# Patient Record
Sex: MALE | Race: ASIAN | NOT HISPANIC OR LATINO
[De-identification: names, ages, dates, MRNs, and addresses within clinical notes are randomized per-mention and may not be internally consistent; named-entity substitution may affect disease eponyms.]

---

## 2017-04-24 PROBLEM — Z00.00 ENCOUNTER FOR PREVENTIVE HEALTH EXAMINATION: Status: ACTIVE | Noted: 2017-04-24

## 2017-04-25 ENCOUNTER — APPOINTMENT (OUTPATIENT)
Dept: SURGERY | Facility: CLINIC | Age: 57
End: 2017-04-25

## 2017-04-25 VITALS
WEIGHT: 148 LBS | BODY MASS INDEX: 23.23 KG/M2 | DIASTOLIC BLOOD PRESSURE: 97 MMHG | HEIGHT: 67 IN | SYSTOLIC BLOOD PRESSURE: 144 MMHG

## 2017-04-25 DIAGNOSIS — L40.50 ARTHROPATHIC PSORIASIS, UNSPECIFIED: ICD-10-CM

## 2017-04-25 DIAGNOSIS — Z86.79 PERSONAL HISTORY OF OTHER DISEASES OF THE CIRCULATORY SYSTEM: ICD-10-CM

## 2017-04-25 DIAGNOSIS — Z87.891 PERSONAL HISTORY OF NICOTINE DEPENDENCE: ICD-10-CM

## 2017-04-25 DIAGNOSIS — Z78.9 OTHER SPECIFIED HEALTH STATUS: ICD-10-CM

## 2017-04-26 ENCOUNTER — TRANSCRIPTION ENCOUNTER (OUTPATIENT)
Age: 57
End: 2017-04-26

## 2017-04-28 DIAGNOSIS — K31.9 DISEASE OF STOMACH AND DUODENUM, UNSPECIFIED: ICD-10-CM

## 2017-05-11 ENCOUNTER — OUTPATIENT (OUTPATIENT)
Dept: OUTPATIENT SERVICES | Facility: HOSPITAL | Age: 57
LOS: 1 days | End: 2017-05-11
Payer: MEDICAID

## 2017-05-11 ENCOUNTER — RESULT REVIEW (OUTPATIENT)
Age: 57
End: 2017-05-11

## 2017-05-11 ENCOUNTER — APPOINTMENT (OUTPATIENT)
Dept: GASTROENTEROLOGY | Facility: HOSPITAL | Age: 57
End: 2017-05-11

## 2017-05-11 DIAGNOSIS — K31.9 DISEASE OF STOMACH AND DUODENUM, UNSPECIFIED: ICD-10-CM

## 2017-05-11 PROCEDURE — C1726: CPT

## 2017-05-11 PROCEDURE — 43237 ENDOSCOPIC US EXAM ESOPH: CPT | Mod: XS

## 2017-05-11 PROCEDURE — 43239 EGD BIOPSY SINGLE/MULTIPLE: CPT

## 2017-05-22 ENCOUNTER — APPOINTMENT (OUTPATIENT)
Dept: CARDIOLOGY | Facility: CLINIC | Age: 57
End: 2017-05-22

## 2017-05-22 VITALS
RESPIRATION RATE: 15 BRPM | HEIGHT: 67 IN | SYSTOLIC BLOOD PRESSURE: 135 MMHG | HEART RATE: 69 BPM | WEIGHT: 146 LBS | BODY MASS INDEX: 22.91 KG/M2 | DIASTOLIC BLOOD PRESSURE: 87 MMHG

## 2017-05-22 DIAGNOSIS — E78.5 HYPERLIPIDEMIA, UNSPECIFIED: ICD-10-CM

## 2017-05-22 DIAGNOSIS — Z01.818 ENCOUNTER FOR OTHER PREPROCEDURAL EXAMINATION: ICD-10-CM

## 2017-05-22 DIAGNOSIS — I10 ESSENTIAL (PRIMARY) HYPERTENSION: ICD-10-CM

## 2017-05-24 ENCOUNTER — OUTPATIENT (OUTPATIENT)
Dept: OUTPATIENT SERVICES | Facility: HOSPITAL | Age: 57
LOS: 1 days | End: 2017-05-24
Payer: MEDICAID

## 2017-05-24 VITALS
RESPIRATION RATE: 16 BRPM | OXYGEN SATURATION: 99 % | TEMPERATURE: 98 F | HEIGHT: 67 IN | DIASTOLIC BLOOD PRESSURE: 74 MMHG | HEART RATE: 57 BPM | SYSTOLIC BLOOD PRESSURE: 117 MMHG | WEIGHT: 149.91 LBS

## 2017-05-24 DIAGNOSIS — K25.7 CHRONIC GASTRIC ULCER WITHOUT HEMORRHAGE OR PERFORATION: ICD-10-CM

## 2017-05-24 DIAGNOSIS — Z90.49 ACQUIRED ABSENCE OF OTHER SPECIFIED PARTS OF DIGESTIVE TRACT: Chronic | ICD-10-CM

## 2017-05-24 DIAGNOSIS — K31.9 DISEASE OF STOMACH AND DUODENUM, UNSPECIFIED: ICD-10-CM

## 2017-05-24 DIAGNOSIS — I10 ESSENTIAL (PRIMARY) HYPERTENSION: ICD-10-CM

## 2017-05-24 LAB
BLD GP AB SCN SERPL QL: NEGATIVE — SIGNIFICANT CHANGE UP
RH IG SCN BLD-IMP: POSITIVE — SIGNIFICANT CHANGE UP

## 2017-05-24 RX ORDER — CEFAZOLIN SODIUM 1 G
2000 VIAL (EA) INJECTION ONCE
Qty: 0 | Refills: 0 | Status: DISCONTINUED | OUTPATIENT
Start: 2017-06-05 | End: 2017-06-05

## 2017-05-24 RX ORDER — SODIUM CHLORIDE 9 MG/ML
3 INJECTION INTRAMUSCULAR; INTRAVENOUS; SUBCUTANEOUS EVERY 8 HOURS
Qty: 0 | Refills: 0 | Status: DISCONTINUED | OUTPATIENT
Start: 2017-06-05 | End: 2017-06-05

## 2017-05-24 NOTE — H&P PST ADULT - GIT GEN HX ROS MEA POS PC
abdominal discomfort after eating x many years/nausea abdominal discomfort/epigastric pain after eating x many years/nausea

## 2017-05-24 NOTE — H&P PST ADULT - HISTORY OF PRESENT ILLNESS
56 year old Greek male refused to use free  services requested to use his nursing student son for translation with PMH of HTN, HLD, chronic gastric ulcer found to have  high grade dysplasia on the gastric antrum planned for laparoscopic gastrectomy.

## 2017-05-24 NOTE — H&P PST ADULT - PMH
Chronic gastric ulcer, unspecified whether gastric ulcer hemorrhage or perforation present    Hyperlipidemia, unspecified hyperlipidemia type Chronic gastric ulcer, unspecified whether gastric ulcer hemorrhage or perforation present    Essential hypertension    Gastric lesion  with high grade dysplasia  Hyperlipidemia, unspecified hyperlipidemia type

## 2017-05-24 NOTE — H&P PST ADULT - NSANTHOSAYNRD_GEN_A_CORE
No. RANDA screening performed.  STOP BANG Legend: 0-2 = LOW Risk; 3-4 = INTERMEDIATE Risk; 5-8 = HIGH Risk

## 2017-05-24 NOTE — H&P PST ADULT - PROBLEM SELECTOR PLAN 1
laparoscopic gastrectomy  PST labs send  preprocedure surgical scrub instructions discussed with syd

## 2017-05-25 LAB
ANION GAP SERPL CALC-SCNC: 18 MMOL/L — HIGH (ref 5–17)
BUN SERPL-MCNC: 15 MG/DL — SIGNIFICANT CHANGE UP (ref 7–23)
CALCIUM SERPL-MCNC: 9.1 MG/DL — SIGNIFICANT CHANGE UP (ref 8.4–10.5)
CHLORIDE SERPL-SCNC: 100 MMOL/L — SIGNIFICANT CHANGE UP (ref 96–108)
CO2 SERPL-SCNC: 23 MMOL/L — SIGNIFICANT CHANGE UP (ref 22–31)
CREAT SERPL-MCNC: 0.67 MG/DL — SIGNIFICANT CHANGE UP (ref 0.5–1.3)
GLUCOSE SERPL-MCNC: 107 MG/DL — HIGH (ref 70–99)
HCT VFR BLD CALC: 46.4 % — SIGNIFICANT CHANGE UP (ref 39–50)
HGB BLD-MCNC: 14.9 G/DL — SIGNIFICANT CHANGE UP (ref 13–17)
MCHC RBC-ENTMCNC: 30.2 PG — SIGNIFICANT CHANGE UP (ref 27–34)
MCHC RBC-ENTMCNC: 32.1 GM/DL — SIGNIFICANT CHANGE UP (ref 32–36)
MCV RBC AUTO: 94.1 FL — SIGNIFICANT CHANGE UP (ref 80–100)
PLATELET # BLD AUTO: 326 K/UL — SIGNIFICANT CHANGE UP (ref 150–400)
POTASSIUM SERPL-MCNC: 4.5 MMOL/L — SIGNIFICANT CHANGE UP (ref 3.5–5.3)
POTASSIUM SERPL-SCNC: 4.5 MMOL/L — SIGNIFICANT CHANGE UP (ref 3.5–5.3)
RBC # BLD: 4.93 M/UL — SIGNIFICANT CHANGE UP (ref 4.2–5.8)
RBC # FLD: 12.8 % — SIGNIFICANT CHANGE UP (ref 10.3–14.5)
SODIUM SERPL-SCNC: 141 MMOL/L — SIGNIFICANT CHANGE UP (ref 135–145)
WBC # BLD: 7.02 K/UL — SIGNIFICANT CHANGE UP (ref 3.8–10.5)
WBC # FLD AUTO: 7.02 K/UL — SIGNIFICANT CHANGE UP (ref 3.8–10.5)

## 2017-05-30 ENCOUNTER — APPOINTMENT (OUTPATIENT)
Dept: SURGERY | Facility: CLINIC | Age: 57
End: 2017-05-30

## 2017-05-30 VITALS
OXYGEN SATURATION: 97 % | TEMPERATURE: 98 F | DIASTOLIC BLOOD PRESSURE: 85 MMHG | RESPIRATION RATE: 18 BRPM | WEIGHT: 152 LBS | HEART RATE: 58 BPM | SYSTOLIC BLOOD PRESSURE: 136 MMHG | BODY MASS INDEX: 23.81 KG/M2

## 2017-06-05 ENCOUNTER — RESULT REVIEW (OUTPATIENT)
Age: 57
End: 2017-06-05

## 2017-06-05 ENCOUNTER — TRANSCRIPTION ENCOUNTER (OUTPATIENT)
Age: 57
End: 2017-06-05

## 2017-06-05 ENCOUNTER — INPATIENT (INPATIENT)
Facility: HOSPITAL | Age: 57
LOS: 2 days | Discharge: HOME | DRG: 328 | End: 2017-06-08
Attending: SURGERY | Admitting: SURGERY
Payer: MEDICAID

## 2017-06-05 VITALS
TEMPERATURE: 98 F | SYSTOLIC BLOOD PRESSURE: 138 MMHG | RESPIRATION RATE: 16 BRPM | HEART RATE: 58 BPM | DIASTOLIC BLOOD PRESSURE: 821 MMHG | OXYGEN SATURATION: 100 %

## 2017-06-05 DIAGNOSIS — K25.7 CHRONIC GASTRIC ULCER WITHOUT HEMORRHAGE OR PERFORATION: ICD-10-CM

## 2017-06-05 DIAGNOSIS — Z90.49 ACQUIRED ABSENCE OF OTHER SPECIFIED PARTS OF DIGESTIVE TRACT: Chronic | ICD-10-CM

## 2017-06-05 LAB — RH IG SCN BLD-IMP: POSITIVE — SIGNIFICANT CHANGE UP

## 2017-06-05 PROCEDURE — 88309 TISSUE EXAM BY PATHOLOGIST: CPT | Mod: 26

## 2017-06-05 PROCEDURE — 88331 PATH CONSLTJ SURG 1 BLK 1SPC: CPT | Mod: 26

## 2017-06-05 PROCEDURE — 88360 TUMOR IMMUNOHISTOCHEM/MANUAL: CPT | Mod: 26

## 2017-06-05 PROCEDURE — 43659 UNLISTED LAPS PX STOMACH: CPT | Mod: GC

## 2017-06-05 RX ORDER — MORPHINE SULFATE 50 MG/1
2 CAPSULE, EXTENDED RELEASE ORAL EVERY 4 HOURS
Qty: 0 | Refills: 0 | Status: DISCONTINUED | OUTPATIENT
Start: 2017-06-05 | End: 2017-06-08

## 2017-06-05 RX ORDER — ACETAMINOPHEN 500 MG
1000 TABLET ORAL ONCE
Qty: 0 | Refills: 0 | Status: COMPLETED | OUTPATIENT
Start: 2017-06-06 | End: 2017-06-06

## 2017-06-05 RX ORDER — ACETAMINOPHEN 500 MG
1000 TABLET ORAL ONCE
Qty: 0 | Refills: 0 | Status: COMPLETED | OUTPATIENT
Start: 2017-06-05 | End: 2017-06-05

## 2017-06-05 RX ORDER — MORPHINE SULFATE 50 MG/1
4 CAPSULE, EXTENDED RELEASE ORAL EVERY 4 HOURS
Qty: 0 | Refills: 0 | Status: DISCONTINUED | OUTPATIENT
Start: 2017-06-05 | End: 2017-06-08

## 2017-06-05 RX ORDER — ENOXAPARIN SODIUM 100 MG/ML
40 INJECTION SUBCUTANEOUS DAILY
Qty: 0 | Refills: 0 | Status: DISCONTINUED | OUTPATIENT
Start: 2017-06-05 | End: 2017-06-08

## 2017-06-05 RX ORDER — ACETAMINOPHEN 500 MG
1000 TABLET ORAL ONCE
Qty: 0 | Refills: 0 | Status: DISCONTINUED | OUTPATIENT
Start: 2017-06-05 | End: 2017-06-07

## 2017-06-05 RX ORDER — ONDANSETRON 8 MG/1
4 TABLET, FILM COATED ORAL ONCE
Qty: 0 | Refills: 0 | Status: DISCONTINUED | OUTPATIENT
Start: 2017-06-05 | End: 2017-06-05

## 2017-06-05 RX ORDER — METOPROLOL TARTRATE 50 MG
5 TABLET ORAL EVERY 6 HOURS
Qty: 0 | Refills: 0 | Status: DISCONTINUED | OUTPATIENT
Start: 2017-06-05 | End: 2017-06-06

## 2017-06-05 RX ORDER — PANTOPRAZOLE SODIUM 20 MG/1
40 TABLET, DELAYED RELEASE ORAL DAILY
Qty: 0 | Refills: 0 | Status: DISCONTINUED | OUTPATIENT
Start: 2017-06-05 | End: 2017-06-06

## 2017-06-05 RX ORDER — HYDROMORPHONE HYDROCHLORIDE 2 MG/ML
0.5 INJECTION INTRAMUSCULAR; INTRAVENOUS; SUBCUTANEOUS
Qty: 0 | Refills: 0 | Status: DISCONTINUED | OUTPATIENT
Start: 2017-06-05 | End: 2017-06-05

## 2017-06-05 RX ORDER — SODIUM CHLORIDE 9 MG/ML
1000 INJECTION, SOLUTION INTRAVENOUS
Qty: 0 | Refills: 0 | Status: DISCONTINUED | OUTPATIENT
Start: 2017-06-05 | End: 2017-06-06

## 2017-06-05 RX ADMIN — Medication 1000 MILLIGRAM(S): at 20:02

## 2017-06-05 RX ADMIN — Medication 5 MILLIGRAM(S): at 17:18

## 2017-06-05 RX ADMIN — Medication 400 MILLIGRAM(S): at 19:42

## 2017-06-05 RX ADMIN — MORPHINE SULFATE 2 MILLIGRAM(S): 50 CAPSULE, EXTENDED RELEASE ORAL at 17:18

## 2017-06-05 RX ADMIN — MORPHINE SULFATE 2 MILLIGRAM(S): 50 CAPSULE, EXTENDED RELEASE ORAL at 17:48

## 2017-06-05 RX ADMIN — SODIUM CHLORIDE 100 MILLILITER(S): 9 INJECTION, SOLUTION INTRAVENOUS at 16:38

## 2017-06-06 LAB
ANION GAP SERPL CALC-SCNC: 11 MMOL/L — SIGNIFICANT CHANGE UP (ref 5–17)
BUN SERPL-MCNC: 9 MG/DL — SIGNIFICANT CHANGE UP (ref 7–23)
CALCIUM SERPL-MCNC: 9.2 MG/DL — SIGNIFICANT CHANGE UP (ref 8.4–10.5)
CHLORIDE SERPL-SCNC: 104 MMOL/L — SIGNIFICANT CHANGE UP (ref 96–108)
CO2 SERPL-SCNC: 31 MMOL/L — SIGNIFICANT CHANGE UP (ref 22–31)
CREAT SERPL-MCNC: 0.73 MG/DL — SIGNIFICANT CHANGE UP (ref 0.5–1.3)
GLUCOSE SERPL-MCNC: 98 MG/DL — SIGNIFICANT CHANGE UP (ref 70–99)
HCT VFR BLD CALC: 48.2 % — SIGNIFICANT CHANGE UP (ref 39–50)
HGB BLD-MCNC: 15.9 G/DL — SIGNIFICANT CHANGE UP (ref 13–17)
MAGNESIUM SERPL-MCNC: 3.2 MG/DL — HIGH (ref 1.6–2.6)
MCHC RBC-ENTMCNC: 31.3 PG — SIGNIFICANT CHANGE UP (ref 27–34)
MCHC RBC-ENTMCNC: 32.9 GM/DL — SIGNIFICANT CHANGE UP (ref 32–36)
MCV RBC AUTO: 95.1 FL — SIGNIFICANT CHANGE UP (ref 80–100)
PHOSPHATE SERPL-MCNC: 3.2 MG/DL — SIGNIFICANT CHANGE UP (ref 2.5–4.5)
PLATELET # BLD AUTO: 292 K/UL — SIGNIFICANT CHANGE UP (ref 150–400)
POTASSIUM SERPL-MCNC: 4.6 MMOL/L — SIGNIFICANT CHANGE UP (ref 3.5–5.3)
POTASSIUM SERPL-SCNC: 4.6 MMOL/L — SIGNIFICANT CHANGE UP (ref 3.5–5.3)
RBC # BLD: 5.06 M/UL — SIGNIFICANT CHANGE UP (ref 4.2–5.8)
RBC # FLD: 11.2 % — SIGNIFICANT CHANGE UP (ref 10.3–14.5)
SODIUM SERPL-SCNC: 146 MMOL/L — HIGH (ref 135–145)
WBC # BLD: 12.1 K/UL — HIGH (ref 3.8–10.5)
WBC # FLD AUTO: 12.1 K/UL — HIGH (ref 3.8–10.5)

## 2017-06-06 RX ORDER — METOPROLOL TARTRATE 50 MG
5 TABLET ORAL EVERY 6 HOURS
Qty: 0 | Refills: 0 | Status: DISCONTINUED | OUTPATIENT
Start: 2017-06-06 | End: 2017-06-08

## 2017-06-06 RX ORDER — PANTOPRAZOLE SODIUM 20 MG/1
40 TABLET, DELAYED RELEASE ORAL DAILY
Qty: 0 | Refills: 0 | Status: DISCONTINUED | OUTPATIENT
Start: 2017-06-06 | End: 2017-06-08

## 2017-06-06 RX ORDER — METOPROLOL TARTRATE 50 MG
5 TABLET ORAL EVERY 6 HOURS
Qty: 0 | Refills: 0 | Status: DISCONTINUED | OUTPATIENT
Start: 2017-06-06 | End: 2017-06-06

## 2017-06-06 RX ORDER — ATENOLOL 25 MG/1
50 TABLET ORAL DAILY
Qty: 0 | Refills: 0 | Status: DISCONTINUED | OUTPATIENT
Start: 2017-06-06 | End: 2017-06-06

## 2017-06-06 RX ORDER — AMLODIPINE BESYLATE 2.5 MG/1
10 TABLET ORAL DAILY
Qty: 0 | Refills: 0 | Status: DISCONTINUED | OUTPATIENT
Start: 2017-06-06 | End: 2017-06-06

## 2017-06-06 RX ORDER — PANTOPRAZOLE SODIUM 20 MG/1
40 TABLET, DELAYED RELEASE ORAL
Qty: 0 | Refills: 0 | Status: DISCONTINUED | OUTPATIENT
Start: 2017-06-06 | End: 2017-06-06

## 2017-06-06 RX ORDER — DEXTROSE MONOHYDRATE, SODIUM CHLORIDE, AND POTASSIUM CHLORIDE 50; .745; 4.5 G/1000ML; G/1000ML; G/1000ML
1000 INJECTION, SOLUTION INTRAVENOUS
Qty: 0 | Refills: 0 | Status: DISCONTINUED | OUTPATIENT
Start: 2017-06-06 | End: 2017-06-07

## 2017-06-06 RX ADMIN — DEXTROSE MONOHYDRATE, SODIUM CHLORIDE, AND POTASSIUM CHLORIDE 50 MILLILITER(S): 50; .745; 4.5 INJECTION, SOLUTION INTRAVENOUS at 18:39

## 2017-06-06 RX ADMIN — DEXTROSE MONOHYDRATE, SODIUM CHLORIDE, AND POTASSIUM CHLORIDE 100 MILLILITER(S): 50; .745; 4.5 INJECTION, SOLUTION INTRAVENOUS at 12:24

## 2017-06-06 RX ADMIN — ENOXAPARIN SODIUM 40 MILLIGRAM(S): 100 INJECTION SUBCUTANEOUS at 12:31

## 2017-06-06 RX ADMIN — SODIUM CHLORIDE 100 MILLILITER(S): 9 INJECTION, SOLUTION INTRAVENOUS at 08:39

## 2017-06-06 RX ADMIN — Medication 5 MILLIGRAM(S): at 00:02

## 2017-06-06 RX ADMIN — Medication 5 MILLIGRAM(S): at 17:19

## 2017-06-06 RX ADMIN — MORPHINE SULFATE 2 MILLIGRAM(S): 50 CAPSULE, EXTENDED RELEASE ORAL at 22:20

## 2017-06-06 RX ADMIN — MORPHINE SULFATE 2 MILLIGRAM(S): 50 CAPSULE, EXTENDED RELEASE ORAL at 14:24

## 2017-06-06 RX ADMIN — Medication 5 MILLIGRAM(S): at 05:48

## 2017-06-06 RX ADMIN — Medication 400 MILLIGRAM(S): at 05:48

## 2017-06-06 RX ADMIN — Medication 1000 MILLIGRAM(S): at 00:30

## 2017-06-06 RX ADMIN — MORPHINE SULFATE 2 MILLIGRAM(S): 50 CAPSULE, EXTENDED RELEASE ORAL at 22:40

## 2017-06-06 RX ADMIN — PANTOPRAZOLE SODIUM 40 MILLIGRAM(S): 20 TABLET, DELAYED RELEASE ORAL at 12:31

## 2017-06-06 RX ADMIN — Medication 1000 MILLIGRAM(S): at 06:15

## 2017-06-06 RX ADMIN — MORPHINE SULFATE 4 MILLIGRAM(S): 50 CAPSULE, EXTENDED RELEASE ORAL at 20:00

## 2017-06-06 RX ADMIN — Medication 400 MILLIGRAM(S): at 00:02

## 2017-06-06 RX ADMIN — MORPHINE SULFATE 2 MILLIGRAM(S): 50 CAPSULE, EXTENDED RELEASE ORAL at 14:54

## 2017-06-06 RX ADMIN — Medication 5 MILLIGRAM(S): at 12:31

## 2017-06-06 RX ADMIN — MORPHINE SULFATE 4 MILLIGRAM(S): 50 CAPSULE, EXTENDED RELEASE ORAL at 19:44

## 2017-06-07 LAB
ANION GAP SERPL CALC-SCNC: 13 MMOL/L — SIGNIFICANT CHANGE UP (ref 5–17)
BUN SERPL-MCNC: 11 MG/DL — SIGNIFICANT CHANGE UP (ref 7–23)
CALCIUM SERPL-MCNC: 8.8 MG/DL — SIGNIFICANT CHANGE UP (ref 8.4–10.5)
CHLORIDE SERPL-SCNC: 102 MMOL/L — SIGNIFICANT CHANGE UP (ref 96–108)
CO2 SERPL-SCNC: 27 MMOL/L — SIGNIFICANT CHANGE UP (ref 22–31)
CREAT SERPL-MCNC: 0.72 MG/DL — SIGNIFICANT CHANGE UP (ref 0.5–1.3)
GLUCOSE SERPL-MCNC: 108 MG/DL — HIGH (ref 70–99)
HCT VFR BLD CALC: 46.9 % — SIGNIFICANT CHANGE UP (ref 39–50)
HGB BLD-MCNC: 15 G/DL — SIGNIFICANT CHANGE UP (ref 13–17)
MAGNESIUM SERPL-MCNC: 2.4 MG/DL — SIGNIFICANT CHANGE UP (ref 1.6–2.6)
MCHC RBC-ENTMCNC: 30.7 PG — SIGNIFICANT CHANGE UP (ref 27–34)
MCHC RBC-ENTMCNC: 32.1 GM/DL — SIGNIFICANT CHANGE UP (ref 32–36)
MCV RBC AUTO: 95.9 FL — SIGNIFICANT CHANGE UP (ref 80–100)
PHOSPHATE SERPL-MCNC: 2.8 MG/DL — SIGNIFICANT CHANGE UP (ref 2.5–4.5)
PLATELET # BLD AUTO: 275 K/UL — SIGNIFICANT CHANGE UP (ref 150–400)
POTASSIUM SERPL-MCNC: 4.5 MMOL/L — SIGNIFICANT CHANGE UP (ref 3.5–5.3)
POTASSIUM SERPL-SCNC: 4.5 MMOL/L — SIGNIFICANT CHANGE UP (ref 3.5–5.3)
RBC # BLD: 4.89 M/UL — SIGNIFICANT CHANGE UP (ref 4.2–5.8)
RBC # FLD: 11.2 % — SIGNIFICANT CHANGE UP (ref 10.3–14.5)
SODIUM SERPL-SCNC: 142 MMOL/L — SIGNIFICANT CHANGE UP (ref 135–145)
WBC # BLD: 9.4 K/UL — SIGNIFICANT CHANGE UP (ref 3.8–10.5)
WBC # FLD AUTO: 9.4 K/UL — SIGNIFICANT CHANGE UP (ref 3.8–10.5)

## 2017-06-07 PROCEDURE — 99232 SBSQ HOSP IP/OBS MODERATE 35: CPT

## 2017-06-07 RX ORDER — ACETAMINOPHEN 500 MG
1000 TABLET ORAL ONCE
Qty: 0 | Refills: 0 | Status: COMPLETED | OUTPATIENT
Start: 2017-06-07 | End: 2017-06-07

## 2017-06-07 RX ADMIN — Medication 5 MILLIGRAM(S): at 16:58

## 2017-06-07 RX ADMIN — MORPHINE SULFATE 4 MILLIGRAM(S): 50 CAPSULE, EXTENDED RELEASE ORAL at 05:13

## 2017-06-07 RX ADMIN — MORPHINE SULFATE 4 MILLIGRAM(S): 50 CAPSULE, EXTENDED RELEASE ORAL at 05:30

## 2017-06-07 RX ADMIN — PANTOPRAZOLE SODIUM 40 MILLIGRAM(S): 20 TABLET, DELAYED RELEASE ORAL at 13:06

## 2017-06-07 RX ADMIN — Medication 5 MILLIGRAM(S): at 01:27

## 2017-06-07 RX ADMIN — MORPHINE SULFATE 2 MILLIGRAM(S): 50 CAPSULE, EXTENDED RELEASE ORAL at 20:00

## 2017-06-07 RX ADMIN — ENOXAPARIN SODIUM 40 MILLIGRAM(S): 100 INJECTION SUBCUTANEOUS at 13:06

## 2017-06-07 RX ADMIN — Medication 5 MILLIGRAM(S): at 13:06

## 2017-06-07 RX ADMIN — MORPHINE SULFATE 2 MILLIGRAM(S): 50 CAPSULE, EXTENDED RELEASE ORAL at 19:45

## 2017-06-07 RX ADMIN — Medication 400 MILLIGRAM(S): at 16:55

## 2017-06-07 RX ADMIN — Medication 1000 MILLIGRAM(S): at 17:25

## 2017-06-08 VITALS
OXYGEN SATURATION: 96 % | SYSTOLIC BLOOD PRESSURE: 144 MMHG | RESPIRATION RATE: 18 BRPM | DIASTOLIC BLOOD PRESSURE: 96 MMHG | HEART RATE: 86 BPM | TEMPERATURE: 98 F

## 2017-06-08 RX ORDER — ATENOLOL 25 MG/1
50 TABLET ORAL ONCE
Qty: 0 | Refills: 0 | Status: COMPLETED | OUTPATIENT
Start: 2017-06-08 | End: 2017-06-08

## 2017-06-08 RX ORDER — PANTOPRAZOLE SODIUM 20 MG/1
1 TABLET, DELAYED RELEASE ORAL
Qty: 30 | Refills: 0
Start: 2017-06-08

## 2017-06-08 RX ORDER — ACETAMINOPHEN 500 MG
650 TABLET ORAL
Qty: 0 | Refills: 0 | DISCHARGE
Start: 2017-06-08

## 2017-06-08 RX ORDER — OXYCODONE HYDROCHLORIDE 5 MG/1
5 TABLET ORAL EVERY 4 HOURS
Qty: 0 | Refills: 0 | Status: DISCONTINUED | OUTPATIENT
Start: 2017-06-08 | End: 2017-06-08

## 2017-06-08 RX ORDER — ATENOLOL 25 MG/1
50 TABLET ORAL ONCE
Qty: 0 | Refills: 0 | Status: DISCONTINUED | OUTPATIENT
Start: 2017-06-08 | End: 2017-06-08

## 2017-06-08 RX ORDER — ACETAMINOPHEN 500 MG
650 TABLET ORAL EVERY 6 HOURS
Qty: 0 | Refills: 0 | Status: DISCONTINUED | OUTPATIENT
Start: 2017-06-08 | End: 2017-06-08

## 2017-06-08 RX ORDER — OXYCODONE HYDROCHLORIDE 5 MG/1
10 TABLET ORAL EVERY 4 HOURS
Qty: 0 | Refills: 0 | Status: DISCONTINUED | OUTPATIENT
Start: 2017-06-08 | End: 2017-06-08

## 2017-06-08 RX ORDER — OXYCODONE HYDROCHLORIDE 5 MG/1
5 TABLET ORAL
Qty: 80 | Refills: 0
Start: 2017-06-08

## 2017-06-08 RX ORDER — ATENOLOL 25 MG/1
50 TABLET ORAL DAILY
Qty: 0 | Refills: 0 | Status: DISCONTINUED | OUTPATIENT
Start: 2017-06-09 | End: 2017-06-08

## 2017-06-08 RX ORDER — PANTOPRAZOLE SODIUM 20 MG/1
40 TABLET, DELAYED RELEASE ORAL
Qty: 0 | Refills: 0 | Status: DISCONTINUED | OUTPATIENT
Start: 2017-06-08 | End: 2017-06-08

## 2017-06-08 RX ADMIN — ATENOLOL 50 MILLIGRAM(S): 25 TABLET ORAL at 12:06

## 2017-06-08 RX ADMIN — Medication 5 MILLIGRAM(S): at 05:02

## 2017-06-08 RX ADMIN — OXYCODONE HYDROCHLORIDE 10 MILLIGRAM(S): 5 TABLET ORAL at 09:14

## 2017-06-08 RX ADMIN — PANTOPRAZOLE SODIUM 40 MILLIGRAM(S): 20 TABLET, DELAYED RELEASE ORAL at 08:44

## 2017-06-08 RX ADMIN — OXYCODONE HYDROCHLORIDE 10 MILLIGRAM(S): 5 TABLET ORAL at 13:27

## 2017-06-08 RX ADMIN — MORPHINE SULFATE 4 MILLIGRAM(S): 50 CAPSULE, EXTENDED RELEASE ORAL at 02:05

## 2017-06-08 RX ADMIN — ENOXAPARIN SODIUM 40 MILLIGRAM(S): 100 INJECTION SUBCUTANEOUS at 12:06

## 2017-06-08 RX ADMIN — MORPHINE SULFATE 4 MILLIGRAM(S): 50 CAPSULE, EXTENDED RELEASE ORAL at 02:30

## 2017-06-08 RX ADMIN — Medication 5 MILLIGRAM(S): at 00:02

## 2017-06-08 RX ADMIN — OXYCODONE HYDROCHLORIDE 10 MILLIGRAM(S): 5 TABLET ORAL at 08:44

## 2017-06-08 NOTE — DISCHARGE NOTE ADULT - MEDICATION SUMMARY - MEDICATIONS TO TAKE
I will START or STAY ON the medications listed below when I get home from the hospital:    acetaminophen 160 mg/5 mL oral suspension  -- 650 milligram(s) by mouth every 6 hours, As Needed -Mild Pain (1 - 3) - 3)  -- Indication: For pain    oxyCODONE 5 mg/5 mL oral solution  -- 5-10 milliliter(s) by mouth every 4-6 hours, As needed for Pain MDD:60 ml  -- Indication: For pain    atenolol 50 mg oral tablet  -- 1 tab(s) by mouth once a day  -- Indication: For High blood pressure    amLODIPine 10 mg oral tablet  -- 1 tab(s) by mouth once a day  -- Indication: For High blood pressure    pantoprazole 40 mg oral delayed release tablet  -- 1 tab(s) by mouth once a day  -- It is very important that you take or use this exactly as directed.  Do not skip doses or discontinue unless directed by your doctor.  Obtain medical advice before taking any non-prescription drugs as some may affect the action of this medication.  Swallow whole.  Do not crush.    -- Indication: For prevent ulcers

## 2017-06-08 NOTE — PROGRESS NOTE ADULT - ASSESSMENT
56M s/p Laparoscopic subtotal gastrectomy for high grade dysplasia 56M s/p Laparoscopic subtotal gastrectomy for high grade dysplasia  - Pain control prn  - f/u GI fxn  - OOB  - D/C planning

## 2017-06-08 NOTE — DISCHARGE NOTE ADULT - HOSPITAL COURSE
56 year old Italian male refused to use free  services requested to use his nursing student son for translation with PMH of HTN, HLD, chronic gastric ulcer found to have  high grade dysplasia on the gastric antrum. He underwent a  Laparoscopic subtotal gastrectomy on 6/5/2017. He tolerated the procedure well was extubated and transferred to PACU in stable condition. On the floor, his diet was advanced slowly to pureed diet, medications transitioned to oral. Pt currently tolerating a pureed diet which he will continue for 2 weeks, ambulating, voiding and pain controlled. Pt stable for discharge to home. he was instructed to follow up with Dr. Tinoco in 1 week.

## 2017-06-08 NOTE — DISCHARGE NOTE ADULT - NS AS ACTIVITY OBS
no driving while on prescription pain medication/Walking-Outdoors allowed/Stairs allowed/Walking-Indoors allowed/No Heavy lifting/straining

## 2017-06-08 NOTE — PROGRESS NOTE ADULT - SUBJECTIVE AND OBJECTIVE BOX
GENERAL SURGERY DAILY PROGRESS NOTE:     Hospital Day: 4    Postoperative Day: 3    Status post: Laparoscopic subtotal gastrectomy    Subjective:              Objective:    PE:    Vital Signs Last 24 Hrs  T(C): 37, Max: 37 (06-07 @ 23:43)  T(F): 98.6, Max: 98.6 (06-07 @ 23:43)  HR: 74 (61 - 82)  BP: 127/86 (127/84 - 132/88)  BP(mean): --  RR: 17 (16 - 18)  SpO2: 96% (95% - 96%)    I&O's Detail  I & Os for 24h ending 07 Jun 2017 07:00  =============================================  IN:    Oral Fluid: 980 ml    dextrose 5% + sodium chloride 0.45% with potassium chloride 20 mEq/L: 900 ml    lactated ringers.: 200 ml    Total IN: 2080 ml  ---------------------------------------------  OUT:    Voided: 2000 ml    Indwelling Catheter - Urethral: 700 ml    Total OUT: 2700 ml  ---------------------------------------------  Total NET: -620 ml    I & Os for current day (as of 08 Jun 2017 04:31)  =============================================  IN:    Oral Fluid: 1140 ml    Total IN: 1140 ml  ---------------------------------------------  OUT:    Voided: 1300 ml    Total OUT: 1300 ml  ---------------------------------------------  Total NET: -160 ml      Daily     Daily     MEDICATIONS  (STANDING):  enoxaparin Injectable 40milliGRAM(s) SubCutaneous daily  pantoprazole  Injectable 40milliGRAM(s) IV Push daily  metoprolol Injectable 5milliGRAM(s) IV Push every 6 hours    MEDICATIONS  (PRN):  morphine  - Injectable 2milliGRAM(s) IV Push every 4 hours PRN Moderate Pain (4 - 6)  morphine  - Injectable 4milliGRAM(s) IV Push every 4 hours PRN Severe Pain (7 - 10)      LABS:                        15.0   9.4   )-----------( 275      ( 07 Jun 2017 06:52 )             46.9     06-07    142  |  102  |  11  ----------------------------<  108<H>  4.5   |  27  |  0.72    Ca    8.8      07 Jun 2017 06:52  Phos  2.8     06-07  Mg     2.4     06-07            RADIOLOGY & ADDITIONAL STUDIES: GENERAL SURGERY DAILY PROGRESS NOTE:     Hospital Day: 4    Postoperative Day: 3    Status post: Laparoscopic subtotal gastrectomy    Subjective: No acute events o/n. Pain controlled. Denies n/v. Passing flatus. No BM.     Objective:     PE:  Gen: NAD, AAOx3  Abd: soft, appropriately tender, ND  Dressings CDI    Vital Signs Last 24 Hrs  T(C): 37, Max: 37 (06-07 @ 23:43)  T(F): 98.6, Max: 98.6 (06-07 @ 23:43)  HR: 74 (61 - 82)  BP: 127/86 (127/84 - 132/88)  BP(mean): --  RR: 17 (16 - 18)  SpO2: 96% (95% - 96%)    I&O's Detail  I & Os for 24h ending 07 Jun 2017 07:00  =============================================  IN:    Oral Fluid: 980 ml    dextrose 5% + sodium chloride 0.45% with potassium chloride 20 mEq/L: 900 ml    lactated ringers.: 200 ml    Total IN: 2080 ml  ---------------------------------------------  OUT:    Voided: 2000 ml    Indwelling Catheter - Urethral: 700 ml    Total OUT: 2700 ml  ---------------------------------------------  Total NET: -620 ml    I & Os for current day (as of 08 Jun 2017 04:31)  =============================================  IN:    Oral Fluid: 1140 ml    Total IN: 1140 ml  ---------------------------------------------  OUT:    Voided: 1300 ml    Total OUT: 1300 ml  ---------------------------------------------  Total NET: -160 ml      Daily     Daily     MEDICATIONS  (STANDING):  enoxaparin Injectable 40milliGRAM(s) SubCutaneous daily  pantoprazole  Injectable 40milliGRAM(s) IV Push daily  metoprolol Injectable 5milliGRAM(s) IV Push every 6 hours    MEDICATIONS  (PRN):  morphine  - Injectable 2milliGRAM(s) IV Push every 4 hours PRN Moderate Pain (4 - 6)  morphine  - Injectable 4milliGRAM(s) IV Push every 4 hours PRN Severe Pain (7 - 10)      LABS:                        15.0   9.4   )-----------( 275      ( 07 Jun 2017 06:52 )             46.9     06-07    142  |  102  |  11  ----------------------------<  108<H>  4.5   |  27  |  0.72    Ca    8.8      07 Jun 2017 06:52  Phos  2.8     06-07  Mg     2.4     06-07            RADIOLOGY & ADDITIONAL STUDIES:

## 2017-06-08 NOTE — DISCHARGE NOTE ADULT - PATIENT PORTAL LINK FT
“You can access the Follow[employer] Patient Portal, offered by [facility], by registering with the following website: [email or web address]”

## 2017-06-08 NOTE — DISCHARGE NOTE ADULT - CARE PLAN
Principal Discharge DX:	Gastric lesion  Goal:	recover from surgery  Instructions for follow-up, activity and diet:	Follow up with Dr. Tinoco in one week. Please call to schedule an appointment.   NOTIFY YOUR SURGEON IF: You have any bleeding that does not stop, any pus draining from your wound, any fever (over 100.4 F) or chills, persistent nausea/vomiting, persistent diarrhea, or if your pain is not controlled on your discharge pain medications.  Instructions for follow-up, activity and diet:	Please follow up with your primary care physician regarding your hospitalization. Please schedule an appointment with your primary care provider within two weeks after your discharge to review your hospital course.

## 2017-06-08 NOTE — DISCHARGE NOTE ADULT - PLAN OF CARE
recover from surgery Follow up with Dr. Tinoco in one week. Please call to schedule an appointment.   NOTIFY YOUR SURGEON IF: You have any bleeding that does not stop, any pus draining from your wound, any fever (over 100.4 F) or chills, persistent nausea/vomiting, persistent diarrhea, or if your pain is not controlled on your discharge pain medications. Please follow up with your primary care physician regarding your hospitalization. Please schedule an appointment with your primary care provider within two weeks after your discharge to review your hospital course.

## 2017-06-08 NOTE — DISCHARGE NOTE ADULT - FUNCTIONAL SCREEN CURRENT LEVEL: BATHING, MLM
[address] [facility] [address] [phone]               Thank you for choosing us for your health care visit with Kristina Mejia MD.  We are glad to serve you and happy to provide you with this sum Call [phone] for help. [employer] is NOT to be used for urgent needs. For medical emergencies, dial 911.                Visit [facility] online at  [email or web address] [email or web address]

## 2017-06-08 NOTE — DISCHARGE NOTE ADULT - MEDICATION SUMMARY - MEDICATIONS TO STOP TAKING
I will STOP taking the medications listed below when I get home from the hospital:    omeprazole 40 mg oral delayed release capsule  -- 1 cap(s) by mouth once a day

## 2017-06-20 ENCOUNTER — APPOINTMENT (OUTPATIENT)
Dept: SURGERY | Facility: CLINIC | Age: 57
End: 2017-06-20

## 2017-07-18 ENCOUNTER — APPOINTMENT (OUTPATIENT)
Dept: SURGERY | Facility: CLINIC | Age: 57
End: 2017-07-18

## 2017-09-19 ENCOUNTER — APPOINTMENT (OUTPATIENT)
Dept: SURGERY | Facility: CLINIC | Age: 57
End: 2017-09-19
Payer: MEDICAID

## 2017-09-19 VITALS
HEART RATE: 52 BPM | OXYGEN SATURATION: 99 % | SYSTOLIC BLOOD PRESSURE: 130 MMHG | HEIGHT: 68 IN | BODY MASS INDEX: 21.07 KG/M2 | DIASTOLIC BLOOD PRESSURE: 79 MMHG | RESPIRATION RATE: 18 BRPM | WEIGHT: 139 LBS | TEMPERATURE: 98.8 F

## 2017-09-19 PROCEDURE — 99024 POSTOP FOLLOW-UP VISIT: CPT

## 2017-12-19 ENCOUNTER — APPOINTMENT (OUTPATIENT)
Dept: SURGERY | Facility: CLINIC | Age: 57
End: 2017-12-19
Payer: MEDICAID

## 2017-12-19 VITALS
DIASTOLIC BLOOD PRESSURE: 83 MMHG | WEIGHT: 141 LBS | HEART RATE: 56 BPM | SYSTOLIC BLOOD PRESSURE: 127 MMHG | HEIGHT: 68 IN | BODY MASS INDEX: 21.37 KG/M2 | TEMPERATURE: 97.9 F

## 2017-12-19 PROCEDURE — 99213 OFFICE O/P EST LOW 20 MIN: CPT

## 2017-12-19 RX ORDER — AMLODIPINE BESYLATE 10 MG/1
10 TABLET ORAL
Refills: 0 | Status: DISCONTINUED | COMMUNITY
End: 2017-12-19

## 2018-03-12 RX ORDER — OMEPRAZOLE 10 MG/1
1 CAPSULE, DELAYED RELEASE ORAL
Qty: 0 | Refills: 0 | COMMUNITY

## 2018-06-12 ENCOUNTER — APPOINTMENT (OUTPATIENT)
Dept: SURGERY | Facility: CLINIC | Age: 58
End: 2018-06-12
Payer: MEDICAID

## 2018-06-12 VITALS
SYSTOLIC BLOOD PRESSURE: 164 MMHG | HEART RATE: 57 BPM | TEMPERATURE: 97.9 F | RESPIRATION RATE: 18 BRPM | WEIGHT: 150 LBS | DIASTOLIC BLOOD PRESSURE: 93 MMHG | BODY MASS INDEX: 22.81 KG/M2 | OXYGEN SATURATION: 99 %

## 2018-06-12 PROCEDURE — 99213 OFFICE O/P EST LOW 20 MIN: CPT

## 2018-07-28 PROBLEM — Z78.9 ALCOHOL USE: Status: ACTIVE | Noted: 2017-04-25

## 2018-12-11 ENCOUNTER — APPOINTMENT (OUTPATIENT)
Dept: SURGERY | Facility: CLINIC | Age: 58
End: 2018-12-11

## 2021-11-15 PROBLEM — K25.7 CHRONIC GASTRIC ULCER WITHOUT HEMORRHAGE OR PERFORATION: Chronic | Status: ACTIVE | Noted: 2017-05-24

## 2021-11-15 PROBLEM — K31.9 DISEASE OF STOMACH AND DUODENUM, UNSPECIFIED: Chronic | Status: ACTIVE | Noted: 2017-05-24

## 2021-11-15 PROBLEM — I10 ESSENTIAL (PRIMARY) HYPERTENSION: Chronic | Status: ACTIVE | Noted: 2017-05-24

## 2021-11-15 PROBLEM — E78.5 HYPERLIPIDEMIA, UNSPECIFIED: Chronic | Status: ACTIVE | Noted: 2017-05-24

## 2021-11-16 ENCOUNTER — TRANSCRIPTION ENCOUNTER (OUTPATIENT)
Age: 61
End: 2021-11-16

## 2021-11-16 DIAGNOSIS — R00.2 PALPITATIONS: ICD-10-CM

## 2021-11-16 DIAGNOSIS — Z87.898 PERSONAL HISTORY OF OTHER SPECIFIED CONDITIONS: ICD-10-CM

## 2021-11-16 DIAGNOSIS — Z78.9 OTHER SPECIFIED HEALTH STATUS: ICD-10-CM

## 2021-11-16 RX ORDER — ATENOLOL 50 MG/1
TABLET ORAL
Refills: 0 | Status: DISCONTINUED | COMMUNITY
End: 2021-11-16

## 2021-11-29 ENCOUNTER — APPOINTMENT (OUTPATIENT)
Dept: CARDIOLOGY | Facility: CLINIC | Age: 61
End: 2021-11-29
Payer: MEDICAID

## 2021-11-29 VITALS
OXYGEN SATURATION: 98 % | TEMPERATURE: 98 F | DIASTOLIC BLOOD PRESSURE: 87 MMHG | HEIGHT: 67 IN | BODY MASS INDEX: 22.6 KG/M2 | SYSTOLIC BLOOD PRESSURE: 131 MMHG | HEART RATE: 83 BPM | WEIGHT: 144 LBS | RESPIRATION RATE: 18 BRPM

## 2021-11-29 DIAGNOSIS — R07.9 CHEST PAIN, UNSPECIFIED: ICD-10-CM

## 2021-11-29 PROCEDURE — 93306 TTE W/DOPPLER COMPLETE: CPT

## 2021-11-29 PROCEDURE — 93000 ELECTROCARDIOGRAM COMPLETE: CPT

## 2021-11-29 PROCEDURE — 99204 OFFICE O/P NEW MOD 45 MIN: CPT

## 2021-11-29 RX ORDER — ASPIRIN 81 MG/1
81 TABLET ORAL
Qty: 30 | Refills: 5 | Status: ACTIVE | COMMUNITY
Start: 2021-11-29 | End: 1900-01-01

## 2021-11-29 NOTE — DISCUSSION/SUMMARY
[FreeTextEntry1] : 61 M HTN hyperlipidemia with CP, SOB and palpitations.\par CHECK ECHOCARDIOGRAM.\par CHECK HOLTER.\par CHECK CARDIAC CTA.\par Start ASA 81.\par Start NG 0.4mg prn for CP.\par Continue medications for HTN and hyperlipidemia.

## 2021-11-29 NOTE — REASON FOR VISIT
[Hyperlipidemia] : hyperlipidemia [Hypertension] : hypertension [FreeTextEntry1] : 61 M HTN hyperlipidemia with CP and SOB.

## 2021-11-29 NOTE — PHYSICAL EXAM
[Well Developed] : well developed [Well Nourished] : well nourished [No Acute Distress] : no acute distress [Normal Conjunctiva] : normal conjunctiva [Normal Venous Pressure] : normal venous pressure [No Carotid Bruit] : no carotid bruit [Normal S1, S2] : normal S1, S2 [No Rub] : no rub [No Gallop] : no gallop [Clear Lung Fields] : clear lung fields [Good Air Entry] : good air entry [No Respiratory Distress] : no respiratory distress  [Soft] : abdomen soft [Non Tender] : non-tender [No Masses/organomegaly] : no masses/organomegaly [Normal Bowel Sounds] : normal bowel sounds [Normal Gait] : normal gait [No Edema] : no edema [No Cyanosis] : no cyanosis [No Clubbing] : no clubbing [No Varicosities] : no varicosities [No Rash] : no rash [No Skin Lesions] : no skin lesions [Moves all extremities] : moves all extremities [No Focal Deficits] : no focal deficits [Normal Speech] : normal speech [Alert and Oriented] : alert and oriented [Normal memory] : normal memory [de-identified] : 2/6 KIANA MANCIA

## 2021-12-02 ENCOUNTER — APPOINTMENT (OUTPATIENT)
Dept: CARDIOLOGY | Facility: CLINIC | Age: 61
End: 2021-12-02

## 2021-12-02 ENCOUNTER — NON-APPOINTMENT (OUTPATIENT)
Age: 61
End: 2021-12-02

## 2021-12-23 ENCOUNTER — APPOINTMENT (OUTPATIENT)
Dept: CARDIOLOGY | Facility: CLINIC | Age: 61
End: 2021-12-23
Payer: MEDICAID

## 2021-12-23 VITALS
OXYGEN SATURATION: 98 % | TEMPERATURE: 98 F | BODY MASS INDEX: 22.55 KG/M2 | WEIGHT: 144 LBS | HEART RATE: 63 BPM | SYSTOLIC BLOOD PRESSURE: 130 MMHG | RESPIRATION RATE: 18 BRPM | DIASTOLIC BLOOD PRESSURE: 86 MMHG

## 2021-12-23 PROCEDURE — 93000 ELECTROCARDIOGRAM COMPLETE: CPT | Mod: 59

## 2021-12-23 PROCEDURE — 99214 OFFICE O/P EST MOD 30 MIN: CPT

## 2021-12-23 NOTE — PHYSICAL EXAM
[Well Developed] : well developed [Well Nourished] : well nourished [No Acute Distress] : no acute distress [Normal Conjunctiva] : normal conjunctiva [Normal Venous Pressure] : normal venous pressure [No Carotid Bruit] : no carotid bruit [Normal S1, S2] : normal S1, S2 [No Rub] : no rub [No Gallop] : no gallop [Clear Lung Fields] : clear lung fields [Good Air Entry] : good air entry [Soft] : abdomen soft [No Respiratory Distress] : no respiratory distress  [Non Tender] : non-tender [No Masses/organomegaly] : no masses/organomegaly [Normal Bowel Sounds] : normal bowel sounds [Normal Gait] : normal gait [No Edema] : no edema [No Cyanosis] : no cyanosis [No Clubbing] : no clubbing [No Varicosities] : no varicosities [No Rash] : no rash [No Skin Lesions] : no skin lesions [Moves all extremities] : moves all extremities [No Focal Deficits] : no focal deficits [Normal Speech] : normal speech [Alert and Oriented] : alert and oriented [Normal memory] : normal memory [de-identified] : 2/6 KIANA MANCIA

## 2021-12-23 NOTE — DISCUSSION/SUMMARY
[FreeTextEntry1] : 61 M HTN hyperlipidemia with CAD. \par Echo, holter and CTA reviewed.\par Mild CAD on CTA: continue ASA 81 QD.\par Continue medications for HTN and hyperlipidemia.\par Echo shows normal LVEF.\par Holter shows rare PACs.\par CP and palpitations were likely from stress.

## 2021-12-23 NOTE — REASON FOR VISIT
[Hyperlipidemia] : hyperlipidemia [Hypertension] : hypertension [Coronary Artery Disease] : coronary artery disease [FreeTextEntry1] : 61 M HTN hyperlipidemia for f/u of echo/ CTA. \par

## 2022-02-24 ENCOUNTER — APPOINTMENT (OUTPATIENT)
Dept: UROLOGY | Facility: CLINIC | Age: 62
End: 2022-02-24
Payer: MEDICAID

## 2022-02-24 PROCEDURE — 99203 OFFICE O/P NEW LOW 30 MIN: CPT

## 2022-02-24 NOTE — PHYSICAL EXAM

## 2022-02-26 NOTE — ASSESSMENT
[FreeTextEntry1] : 60 yo M with nephrolithiasis\par \par - Reviewed imaging from August 2021 through [facility] portal and confirmed nonobstructing left 1.6cm nephrolithiasis\par - Discussed options including observation vs ESWL vs URS/HLL vs PCNL and the pros and cons of each option\par - Will obtain CT without contrast to accurately assess current stone burden and pt to make final decision after CT\par - Discussed possible etiologies for nephrolithiasis. Reviewed behavioral modifications including adequate hydration, cutting back on coffee, dark sodas, dark teas, low sodium diet, increasing citrate levels with lemon juice. \par - Discussed importance of seeking medical attention should intractable flank pain with nausea, vomiting or fever occur\par

## 2022-02-26 NOTE — REVIEW OF SYSTEMS
[Negative] : Heme/Lymph [History of kidney stones] : denies history of kidney stones [FreeTextEntry2] : kidney stones on left side

## 2022-02-26 NOTE — HISTORY OF PRESENT ILLNESS
[FreeTextEntry1] : 60 yo Amharic speaking M with history of gastric cancer\par Incidental finding of left nephrolithiasis\par First seen in 2016 and most recent imaging from Aug, 2021 through [facility] shows some increase in size to 1.6cm\par Of note, imaging done with contrast\par Drinks 2 bottles of water, 1 cup of coffee, herbal tea, no soda\par no urinary complaints\par no gross hematuria\par

## 2022-03-11 ENCOUNTER — APPOINTMENT (OUTPATIENT)
Age: 62
End: 2022-03-11
Payer: MEDICAID

## 2022-03-11 PROCEDURE — 99213 OFFICE O/P EST LOW 20 MIN: CPT

## 2022-03-11 NOTE — HISTORY OF PRESENT ILLNESS
[FreeTextEntry1] : 62 yo Georgian speaking M with history of gastric cancer\par Incidental finding of left nephrolithiasis\par First seen in 2016 and most recent imaging from Aug, 2021 through [facility] shows some increase in size to 1.6cm\par Of note, imaging done with contrast\par Drinks 2 bottles of water, 1 cup of coffee, herbal tea, no soda\par no urinary complaints\par no gross hematuria\par \par 3/11/22 Interval history: No issues since last visit\par Here to review CT imaging without contrast which showed stable left nephrolithiasis

## 2022-03-11 NOTE — ASSESSMENT
[FreeTextEntry1] : 62 yo M with left nephrolithiasis\par \par - Reviewed CT imaging done on 3/10 through [facility] portal and confirmed findings as stated above\par - Again discussed options including surveillance vs URS/HLL vs PCNL vs ESWL and the pros and cons of each option\par - Pt will think about options and contact us once he makes decision

## 2022-03-11 NOTE — PHYSICAL EXAM

## 2022-04-18 ENCOUNTER — APPOINTMENT (OUTPATIENT)
Dept: CARDIOLOGY | Facility: CLINIC | Age: 62
End: 2022-04-18

## 2022-06-16 ENCOUNTER — APPOINTMENT (OUTPATIENT)
Dept: CARDIOLOGY | Facility: CLINIC | Age: 62
End: 2022-06-16
Payer: MEDICAID

## 2022-06-16 VITALS
WEIGHT: 148 LBS | TEMPERATURE: 97.8 F | DIASTOLIC BLOOD PRESSURE: 90 MMHG | OXYGEN SATURATION: 98 % | HEART RATE: 70 BPM | SYSTOLIC BLOOD PRESSURE: 133 MMHG | RESPIRATION RATE: 18 BRPM | BODY MASS INDEX: 23.18 KG/M2

## 2022-06-16 PROCEDURE — 99214 OFFICE O/P EST MOD 30 MIN: CPT

## 2022-06-16 PROCEDURE — 93000 ELECTROCARDIOGRAM COMPLETE: CPT

## 2022-06-16 RX ORDER — NITROGLYCERIN 0.4 MG/1
0.4 TABLET SUBLINGUAL
Qty: 50 | Refills: 1 | Status: DISCONTINUED | COMMUNITY
Start: 2021-11-29 | End: 2022-06-16

## 2022-06-16 NOTE — HISTORY OF PRESENT ILLNESS
[FreeTextEntry1] :  \par 1. HTN: on medications, controlled.\par 2. Hyperlipidemia: on statin. No SE are noted.\par 3. CAD: Echo showed normal LVEF. CTA showed mild CAD. He is on ASA 81 QD and his CP has improved.\par His ET is normal. He denies any new symptoms. \par  \par \par He received his COVID vaccine.

## 2022-06-16 NOTE — DISCUSSION/SUMMARY
[FreeTextEntry1] : 61 M HTN hyperlipidemia CAD for f/u.\par Continue medications for HTN.\par Continue statin for hyperlipidemia.\par Continue ASA 81 for CAD.\par Continue exercise and diet.

## 2022-06-16 NOTE — PHYSICAL EXAM
[Well Developed] : well developed [Well Nourished] : well nourished [No Acute Distress] : no acute distress [Normal Conjunctiva] : normal conjunctiva [Normal Venous Pressure] : normal venous pressure [No Carotid Bruit] : no carotid bruit [Normal S1, S2] : normal S1, S2 [No Rub] : no rub [No Gallop] : no gallop [Clear Lung Fields] : clear lung fields [Good Air Entry] : good air entry [No Respiratory Distress] : no respiratory distress  [Soft] : abdomen soft [Non Tender] : non-tender [No Masses/organomegaly] : no masses/organomegaly [Normal Bowel Sounds] : normal bowel sounds [Normal Gait] : normal gait [No Edema] : no edema [No Cyanosis] : no cyanosis [No Clubbing] : no clubbing [No Varicosities] : no varicosities [No Rash] : no rash [No Skin Lesions] : no skin lesions [Moves all extremities] : moves all extremities [No Focal Deficits] : no focal deficits [Normal Speech] : normal speech [Alert and Oriented] : alert and oriented [Normal memory] : normal memory [de-identified] : 2/6 KIANA MANCIA

## 2022-06-16 NOTE — REASON FOR VISIT
[Hyperlipidemia] : hyperlipidemia [Hypertension] : hypertension [Coronary Artery Disease] : coronary artery disease [FreeTextEntry1] : 61 M HTN hyperlipidemia CAD for f/u.\par \par

## 2022-11-03 ENCOUNTER — APPOINTMENT (OUTPATIENT)
Dept: UROLOGY | Facility: CLINIC | Age: 62
End: 2022-11-03

## 2022-11-03 VITALS
TEMPERATURE: 97.3 F | OXYGEN SATURATION: 98 % | HEART RATE: 68 BPM | SYSTOLIC BLOOD PRESSURE: 129 MMHG | RESPIRATION RATE: 18 BRPM | DIASTOLIC BLOOD PRESSURE: 86 MMHG

## 2022-11-03 PROCEDURE — 99214 OFFICE O/P EST MOD 30 MIN: CPT

## 2022-11-05 LAB — BACTERIA UR CULT: NORMAL

## 2022-11-11 NOTE — HISTORY OF PRESENT ILLNESS
[FreeTextEntry1] : 60 yo Nepali speaking M with history of gastric cancer\par Incidental finding of left nephrolithiasis\par First seen in 2016 and most recent imaging from Aug, 2021 through [facility] shows increasing size of stone. \par \par No hematuria.\par F/U CT scan imaging with larger stone earlier this year.\par \par  [None] : no symptoms

## 2022-11-11 NOTE — ASSESSMENT
[FreeTextEntry1] : D/w pt risks of stone growth, obstruction, management options. We spoke about metabolic issues, management, diet.\par \par Diet modification reviewed at length- increasing fluids (primarily water), citrus is good, and decreasing/moderating salt, animal flesh protein, oxalate containing foods, and moderation of calcium intake (1000 mg/day is USRDA).\par \par I reviewed with the patient the risks of metabolic stone disease given their underlying risk parameters (all of which include large stones, multiple stones, bilateral stones, family history, and young age), and the indications for 24 hour urine metabolic assessment.\par \par DASH diet a good overall diet plan to consider and review for general health and stone health.\par \par We also discussed benefits of regular exercise and weight loss as independent risk reducers for stones.\par \par 1. Diet modification as discussed\par 2. 24 hour urine collection x 2 necessary for eval\par 3. renal US and KUB in f/u to assess size/obstruction\par telehealth to f/u\par

## 2022-12-04 ENCOUNTER — APPOINTMENT (OUTPATIENT)
Dept: RADIOLOGY | Facility: IMAGING CENTER | Age: 62
End: 2022-12-04

## 2022-12-04 ENCOUNTER — OUTPATIENT (OUTPATIENT)
Dept: OUTPATIENT SERVICES | Facility: HOSPITAL | Age: 62
LOS: 1 days | End: 2022-12-04
Payer: MEDICAID

## 2022-12-04 ENCOUNTER — APPOINTMENT (OUTPATIENT)
Dept: ULTRASOUND IMAGING | Facility: IMAGING CENTER | Age: 62
End: 2022-12-04

## 2022-12-04 DIAGNOSIS — N20.0 CALCULUS OF KIDNEY: ICD-10-CM

## 2022-12-04 DIAGNOSIS — Z90.49 ACQUIRED ABSENCE OF OTHER SPECIFIED PARTS OF DIGESTIVE TRACT: Chronic | ICD-10-CM

## 2022-12-04 PROCEDURE — 74018 RADEX ABDOMEN 1 VIEW: CPT

## 2022-12-04 PROCEDURE — 76775 US EXAM ABDO BACK WALL LIM: CPT

## 2022-12-04 PROCEDURE — 76775 US EXAM ABDO BACK WALL LIM: CPT | Mod: 26

## 2022-12-04 PROCEDURE — 74018 RADEX ABDOMEN 1 VIEW: CPT | Mod: 26

## 2022-12-13 ENCOUNTER — APPOINTMENT (OUTPATIENT)
Dept: UROLOGY | Facility: CLINIC | Age: 62
End: 2022-12-13

## 2022-12-13 DIAGNOSIS — N20.0 CALCULUS OF KIDNEY: ICD-10-CM

## 2022-12-13 PROCEDURE — 99442: CPT

## 2022-12-13 NOTE — ASSESSMENT
[FreeTextEntry1] : I had long discussion with patient about their stones, and about options, risks, and benefits of all treatments.  Main options for definitive stone treatment include ESWL, URS, PCNL.  \par \par ESWL success best with smaller, less dense stones, and with short skin to stone distance and favorable location of the stone within the urinary tract, while URS is more successful treatment with multiple stones, more dense stones, or challenging body habitus or stone location.  PCNL is best option for larger, more dense and complex stones, and particularly those involving the lower pole.  Non-definitive stone treatment options for drainage, using either stents or nephrostomy, also reviewed: these are of lower immediate surgical risks, but incur multiple procedures to manage and may have their own complications and effects on quality of life.  Still, nephrostomy or nephroureteral catheter can allow maintenance of urinary system drainage without surgical risks, and management in office with exchanges (avoiding the anesthesia and testing which would be present with bilateral internal stent exchange).\par \par Risks of nontreatment with obstruction can lead to very high rate of renal function loss in stone-bearing kidney over the next months to years.\par \par In this patient's case, I recommend... either left URS with laser or left PCNL if surgical intervention based on size, density. Alternative to do 24 hour urine as planned/suggested, and f/u on medical side given no sx or obstruction at this time.\par \par Risks/benefits/success/recovery expectations all reviewed at length, particularly with respect to patient's comorbidities, and inclusive of infection/sepsis, bleeding, need for secondary procedures or secondary stages such as embolization or open surgery, and even risks of death due to acute issues superimposed on comorbidities.\par \par Pt and son wish to discuss and consider, state they will call back

## 2022-12-13 NOTE — HISTORY OF PRESENT ILLNESS
[Other Location: e.g. School (Enter Location, City,State)___] : at [unfilled], at the time of the visit. [Other Location: e.g. Home (Enter Location, City,State)___] : at [unfilled] [Family Member] : family member [Verbal consent obtained from patient] : the patient, [unfilled] [FreeTextEntry4] : son [FreeTextEntry1] : The patient-doctor relationship has been established in a face to face fashion via real time video/audio HIPAA compliant communication using telemedicine software. The patient's identity has been confirmed. The patient was previously emailed a copy of the telemedicine consent. They have had a chance to review and has now given verbal consent and has requested care to be assessed and treated via telemedicine. They understand there may be limitations in this process, and that they may need further followup care in the office and/or hospital settings.\par \par Verbal consent given on Dec 13 2022 10:00AM\par \par DEO FRIAS is a 62 year M who presents for Incidental finding of left nephrolithiasis\par First seen in 2016 and most recent imaging from Aug, 2021 through [facility] shows increasing size of stone. \par \par Now for review of CT (films scanned into system): 1.8 x 1.3 cm left mid-lower stone, HU mean 970 HU, peak 1260 HU. No hydro on recent CT or renal US.\par \par 12/13/2022 \par \par The patient denies fevers, chills, nausea and/or vomiting, and no unexplained weight loss.\par \par All pertinent parts of the patient PFSH (past medical, family, and social histories), laboratory, radiological studies and available physician notes were reviewed prior to starting the face-to-face portion of the telemedicine visit. Questionnaire results, where appropriate, were discussed with the patient.\par

## 2023-04-24 ENCOUNTER — OUTPATIENT (OUTPATIENT)
Dept: OUTPATIENT SERVICES | Facility: HOSPITAL | Age: 63
LOS: 1 days | End: 2023-04-24
Payer: MEDICAID

## 2023-04-24 VITALS
WEIGHT: 138.01 LBS | HEART RATE: 58 BPM | HEIGHT: 67 IN | DIASTOLIC BLOOD PRESSURE: 74 MMHG | TEMPERATURE: 99 F | RESPIRATION RATE: 16 BRPM | SYSTOLIC BLOOD PRESSURE: 121 MMHG | OXYGEN SATURATION: 100 %

## 2023-04-24 DIAGNOSIS — Z98.890 OTHER SPECIFIED POSTPROCEDURAL STATES: Chronic | ICD-10-CM

## 2023-04-24 DIAGNOSIS — N20.0 CALCULUS OF KIDNEY: ICD-10-CM

## 2023-04-24 DIAGNOSIS — Z90.3 ACQUIRED ABSENCE OF STOMACH [PART OF]: Chronic | ICD-10-CM

## 2023-04-24 DIAGNOSIS — Z01.818 ENCOUNTER FOR OTHER PREPROCEDURAL EXAMINATION: ICD-10-CM

## 2023-04-24 DIAGNOSIS — Z90.49 ACQUIRED ABSENCE OF OTHER SPECIFIED PARTS OF DIGESTIVE TRACT: Chronic | ICD-10-CM

## 2023-04-24 LAB
ANION GAP SERPL CALC-SCNC: 12 MMOL/L — SIGNIFICANT CHANGE UP (ref 5–17)
BLD GP AB SCN SERPL QL: NEGATIVE — SIGNIFICANT CHANGE UP
BUN SERPL-MCNC: 12 MG/DL — SIGNIFICANT CHANGE UP (ref 7–23)
CALCIUM SERPL-MCNC: 9.2 MG/DL — SIGNIFICANT CHANGE UP (ref 8.4–10.5)
CHLORIDE SERPL-SCNC: 101 MMOL/L — SIGNIFICANT CHANGE UP (ref 96–108)
CO2 SERPL-SCNC: 28 MMOL/L — SIGNIFICANT CHANGE UP (ref 22–31)
CREAT SERPL-MCNC: 0.61 MG/DL — SIGNIFICANT CHANGE UP (ref 0.5–1.3)
EGFR: 108 ML/MIN/1.73M2 — SIGNIFICANT CHANGE UP
GLUCOSE SERPL-MCNC: 87 MG/DL — SIGNIFICANT CHANGE UP (ref 70–99)
HCT VFR BLD CALC: 49.5 % — SIGNIFICANT CHANGE UP (ref 39–50)
HGB BLD-MCNC: 16.5 G/DL — SIGNIFICANT CHANGE UP (ref 13–17)
MCHC RBC-ENTMCNC: 31.1 PG — SIGNIFICANT CHANGE UP (ref 27–34)
MCHC RBC-ENTMCNC: 33.3 GM/DL — SIGNIFICANT CHANGE UP (ref 32–36)
MCV RBC AUTO: 93.4 FL — SIGNIFICANT CHANGE UP (ref 80–100)
NRBC # BLD: 0 /100 WBCS — SIGNIFICANT CHANGE UP (ref 0–0)
PLATELET # BLD AUTO: 297 K/UL — SIGNIFICANT CHANGE UP (ref 150–400)
POTASSIUM SERPL-MCNC: 3.9 MMOL/L — SIGNIFICANT CHANGE UP (ref 3.5–5.3)
POTASSIUM SERPL-SCNC: 3.9 MMOL/L — SIGNIFICANT CHANGE UP (ref 3.5–5.3)
RBC # BLD: 5.3 M/UL — SIGNIFICANT CHANGE UP (ref 4.2–5.8)
RBC # FLD: 11.9 % — SIGNIFICANT CHANGE UP (ref 10.3–14.5)
RH IG SCN BLD-IMP: POSITIVE — SIGNIFICANT CHANGE UP
SODIUM SERPL-SCNC: 141 MMOL/L — SIGNIFICANT CHANGE UP (ref 135–145)
WBC # BLD: 6.13 K/UL — SIGNIFICANT CHANGE UP (ref 3.8–10.5)
WBC # FLD AUTO: 6.13 K/UL — SIGNIFICANT CHANGE UP (ref 3.8–10.5)

## 2023-04-24 PROCEDURE — 86850 RBC ANTIBODY SCREEN: CPT

## 2023-04-24 PROCEDURE — 86901 BLOOD TYPING SEROLOGIC RH(D): CPT

## 2023-04-24 PROCEDURE — 86900 BLOOD TYPING SEROLOGIC ABO: CPT

## 2023-04-24 PROCEDURE — G0463: CPT

## 2023-04-24 RX ORDER — ATENOLOL 25 MG/1
1 TABLET ORAL
Qty: 0 | Refills: 0 | DISCHARGE

## 2023-04-24 NOTE — H&P PST ADULT - NSICDXPASTSURGICALHX_GEN_ALL_CORE_FT
PAST SURGICAL HISTORY:  H/O colonoscopy     H/O endoscopy     History of gastrectomy     S/P appendectomy

## 2023-04-24 NOTE — H&P PST ADULT - NSICDXPASTMEDICALHX_GEN_ALL_CORE_FT
PAST MEDICAL HISTORY:  2019 novel coronavirus disease (COVID-19)     Borderline diabetes     Chronic gastric ulcer, unspecified whether gastric ulcer hemorrhage or perforation present     Essential hypertension     Gastric cancer     Gastric lesion with high grade dysplasia    History of BPH     Hyperlipidemia, unspecified hyperlipidemia type     Pain in back

## 2023-04-24 NOTE — H&P PST ADULT - NSHP PST SURGERY DATE_DT_GEN_A_CORE
[de-identified] : c/o clogged left ear.  Feels left ear clogged.  Problem for 1-2 weeks.  Also has hx of ear problems and told of TM perf in right ear.  - Told of problem by [facility].  15-May-2023

## 2023-04-24 NOTE — H&P PST ADULT - HEIGHT IN CM
Progress Notes by Xin Davidson at 06/30/17 04:31 PM     Author:  Xin Davidson Service:  (none) Author Type:       Filed:  06/30/17 04:31 PM Encounter Date:  6/28/2017 Status:  Signed     :  Xin Davidson ()            Patient scheduled with Dr Vick on 7/13/17[KP1.1M]      Revision History        User Key Date/Time User Provider Type Action    > KP1.1 06/30/17 04:31 PM Xin Davidson  Sign    M - Manual             [phone]

## 2023-04-24 NOTE — H&P PST ADULT - HISTORY OF PRESENT ILLNESS
56 year old Azeri male refused to use free  services requested to use his nursing student son for translation with PMH of HTN, HLD, chronic gastric ulcer found to have  high grade dysplasia on the gastric antrum planned for laparoscopic gastrectomy. 63 YO Yi speaking male PMH HTN, HLD, high grade gastric antrum dysplasia 2/2 chronic gastric ulcer s/p partial gastrectomy, incidental finding of left nephrolithiasis (2016) with recent imaging showing stone size increasing - 1.8 x 1.3 cm left mid-lower pole stone, presents to PST for LEFT PERCUTANEOUS NEPHROSTOLITHOTOMY 5/15/2023. Denies any palpitations, SOB, N/V, fever or chills.     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 Denies any palpitations, SOB, N/V, fever or chills.

## 2023-04-24 NOTE — H&P PST ADULT - PROBLEM SELECTOR PLAN 1
LEFT PERCUTANEOUS NEPHROSTOLITHOTOMY  Pre-op education provided - all questions answered   Chlorhex soap & instructions given  CBC, BMP, T&S, Ucx sent in PST  Pt holding aspirin as per PCP instruction

## 2023-04-24 NOTE — H&P PST ADULT - ASSESSMENT
DASI score: 8 mets  DASI activity: ADLs, walking, stairs, no limitation  Loose teeth or denture: denies

## 2023-04-25 LAB
CULTURE RESULTS: SIGNIFICANT CHANGE UP
SPECIMEN SOURCE: SIGNIFICANT CHANGE UP

## 2023-05-12 PROBLEM — U07.1 COVID-19: Chronic | Status: ACTIVE | Noted: 2023-04-24

## 2023-05-12 PROBLEM — Z87.438 PERSONAL HISTORY OF OTHER DISEASES OF MALE GENITAL ORGANS: Chronic | Status: ACTIVE | Noted: 2023-04-24

## 2023-05-12 PROBLEM — R73.03 PREDIABETES: Chronic | Status: ACTIVE | Noted: 2023-04-24

## 2023-05-12 PROBLEM — C16.9 MALIGNANT NEOPLASM OF STOMACH, UNSPECIFIED: Chronic | Status: ACTIVE | Noted: 2023-04-24

## 2023-05-12 PROBLEM — M54.9 DORSALGIA, UNSPECIFIED: Chronic | Status: ACTIVE | Noted: 2023-04-24

## 2023-05-14 ENCOUNTER — TRANSCRIPTION ENCOUNTER (OUTPATIENT)
Age: 63
End: 2023-05-14

## 2023-05-15 ENCOUNTER — APPOINTMENT (OUTPATIENT)
Dept: UROLOGY | Facility: HOSPITAL | Age: 63
End: 2023-05-15

## 2023-05-15 ENCOUNTER — INPATIENT (INPATIENT)
Facility: HOSPITAL | Age: 63
LOS: 11 days | Discharge: HOME | DRG: 660 | End: 2023-05-27
Attending: UROLOGY | Admitting: UROLOGY
Payer: MEDICAID

## 2023-05-15 ENCOUNTER — RESULT REVIEW (OUTPATIENT)
Age: 63
End: 2023-05-15

## 2023-05-15 VITALS
WEIGHT: 138.01 LBS | RESPIRATION RATE: 14 BRPM | SYSTOLIC BLOOD PRESSURE: 151 MMHG | HEIGHT: 67 IN | TEMPERATURE: 98 F | OXYGEN SATURATION: 99 % | DIASTOLIC BLOOD PRESSURE: 86 MMHG | HEART RATE: 61 BPM

## 2023-05-15 DIAGNOSIS — Z98.890 OTHER SPECIFIED POSTPROCEDURAL STATES: Chronic | ICD-10-CM

## 2023-05-15 DIAGNOSIS — Z90.3 ACQUIRED ABSENCE OF STOMACH [PART OF]: Chronic | ICD-10-CM

## 2023-05-15 DIAGNOSIS — Z90.49 ACQUIRED ABSENCE OF OTHER SPECIFIED PARTS OF DIGESTIVE TRACT: Chronic | ICD-10-CM

## 2023-05-15 DIAGNOSIS — N20.0 CALCULUS OF KIDNEY: ICD-10-CM

## 2023-05-15 LAB
ANION GAP SERPL CALC-SCNC: 12 MMOL/L — SIGNIFICANT CHANGE UP (ref 5–17)
BASOPHILS # BLD AUTO: 0.03 K/UL — SIGNIFICANT CHANGE UP (ref 0–0.2)
BASOPHILS NFR BLD AUTO: 0.5 % — SIGNIFICANT CHANGE UP (ref 0–2)
BUN SERPL-MCNC: 13 MG/DL — SIGNIFICANT CHANGE UP (ref 7–23)
CALCIUM SERPL-MCNC: 8.2 MG/DL — LOW (ref 8.4–10.5)
CHLORIDE SERPL-SCNC: 102 MMOL/L — SIGNIFICANT CHANGE UP (ref 96–108)
CO2 SERPL-SCNC: 27 MMOL/L — SIGNIFICANT CHANGE UP (ref 22–31)
CREAT SERPL-MCNC: 0.69 MG/DL — SIGNIFICANT CHANGE UP (ref 0.5–1.3)
EGFR: 104 ML/MIN/1.73M2 — SIGNIFICANT CHANGE UP
EOSINOPHIL # BLD AUTO: 0.14 K/UL — SIGNIFICANT CHANGE UP (ref 0–0.5)
EOSINOPHIL NFR BLD AUTO: 2.3 % — SIGNIFICANT CHANGE UP (ref 0–6)
GLUCOSE SERPL-MCNC: 114 MG/DL — HIGH (ref 70–99)
HCT VFR BLD CALC: 45.1 % — SIGNIFICANT CHANGE UP (ref 39–50)
HGB BLD-MCNC: 14.7 G/DL — SIGNIFICANT CHANGE UP (ref 13–17)
IMM GRANULOCYTES NFR BLD AUTO: 0.5 % — SIGNIFICANT CHANGE UP (ref 0–0.9)
LYMPHOCYTES # BLD AUTO: 2.34 K/UL — SIGNIFICANT CHANGE UP (ref 1–3.3)
LYMPHOCYTES # BLD AUTO: 38.9 % — SIGNIFICANT CHANGE UP (ref 13–44)
MCHC RBC-ENTMCNC: 30.4 PG — SIGNIFICANT CHANGE UP (ref 27–34)
MCHC RBC-ENTMCNC: 32.6 GM/DL — SIGNIFICANT CHANGE UP (ref 32–36)
MCV RBC AUTO: 93.2 FL — SIGNIFICANT CHANGE UP (ref 80–100)
MONOCYTES # BLD AUTO: 0.39 K/UL — SIGNIFICANT CHANGE UP (ref 0–0.9)
MONOCYTES NFR BLD AUTO: 6.5 % — SIGNIFICANT CHANGE UP (ref 2–14)
NEUTROPHILS # BLD AUTO: 3.08 K/UL — SIGNIFICANT CHANGE UP (ref 1.8–7.4)
NEUTROPHILS NFR BLD AUTO: 51.3 % — SIGNIFICANT CHANGE UP (ref 43–77)
NRBC # BLD: 0 /100 WBCS — SIGNIFICANT CHANGE UP (ref 0–0)
PLATELET # BLD AUTO: 266 K/UL — SIGNIFICANT CHANGE UP (ref 150–400)
POTASSIUM SERPL-MCNC: 4 MMOL/L — SIGNIFICANT CHANGE UP (ref 3.5–5.3)
POTASSIUM SERPL-SCNC: 4 MMOL/L — SIGNIFICANT CHANGE UP (ref 3.5–5.3)
RBC # BLD: 4.84 M/UL — SIGNIFICANT CHANGE UP (ref 4.2–5.8)
RBC # FLD: 12 % — SIGNIFICANT CHANGE UP (ref 10.3–14.5)
SODIUM SERPL-SCNC: 141 MMOL/L — SIGNIFICANT CHANGE UP (ref 135–145)
WBC # BLD: 6.01 K/UL — SIGNIFICANT CHANGE UP (ref 3.8–10.5)
WBC # FLD AUTO: 6.01 K/UL — SIGNIFICANT CHANGE UP (ref 3.8–10.5)

## 2023-05-15 PROCEDURE — 71045 X-RAY EXAM CHEST 1 VIEW: CPT | Mod: 26

## 2023-05-15 PROCEDURE — 50437 DILAT XST TRC NEW ACCESS RCS: CPT | Mod: LT,59

## 2023-05-15 PROCEDURE — 88300 SURGICAL PATH GROSS: CPT | Mod: 26

## 2023-05-15 PROCEDURE — 50081 PERQ NL/PL LITHOTRP CPLX>2CM: CPT | Mod: LT

## 2023-05-15 PROCEDURE — 50433 PLMT NEPHROURETERAL CATHETER: CPT | Mod: LT,59

## 2023-05-15 DEVICE — URETERAL CATH IMAGER II BERN 5FR 65CM: Type: IMPLANTABLE DEVICE | Status: FUNCTIONAL

## 2023-05-15 DEVICE — URETERAL CATH AXXCESS OPEN END 6FR 70CM: Type: IMPLANTABLE DEVICE | Status: FUNCTIONAL

## 2023-05-15 DEVICE — GUIDEWIRE SENSOR DUAL-FLEX NITINOL STRAIGHT .035" X 150CM: Type: IMPLANTABLE DEVICE | Status: FUNCTIONAL

## 2023-05-15 DEVICE — TRILOGY PROBE KIT 3.9MM X 440MM (BLUE): Type: IMPLANTABLE DEVICE | Status: FUNCTIONAL

## 2023-05-15 DEVICE — NEPHROSTOMY BALLOON CATH X-FORCE 7FR X 15CM 10MM: Type: IMPLANTABLE DEVICE | Status: FUNCTIONAL

## 2023-05-15 DEVICE — GUIDEWIRE J HEAVY DUTY 0.038" X 80CM: Type: IMPLANTABLE DEVICE | Status: FUNCTIONAL

## 2023-05-15 RX ORDER — ONDANSETRON 8 MG/1
4 TABLET, FILM COATED ORAL ONCE
Refills: 0 | Status: DISCONTINUED | OUTPATIENT
Start: 2023-05-15 | End: 2023-05-15

## 2023-05-15 RX ORDER — SODIUM CHLORIDE 9 MG/ML
1000 INJECTION, SOLUTION INTRAVENOUS
Refills: 0 | Status: DISCONTINUED | OUTPATIENT
Start: 2023-05-15 | End: 2023-05-16

## 2023-05-15 RX ORDER — ASPIRIN/CALCIUM CARB/MAGNESIUM 324 MG
0 TABLET ORAL
Refills: 0 | DISCHARGE

## 2023-05-15 RX ORDER — ACETAMINOPHEN 500 MG
1000 TABLET ORAL ONCE
Refills: 0 | Status: COMPLETED | OUTPATIENT
Start: 2023-05-15 | End: 2023-05-15

## 2023-05-15 RX ORDER — CEFAZOLIN SODIUM 1 G
2000 VIAL (EA) INJECTION ONCE
Refills: 0 | Status: COMPLETED | OUTPATIENT
Start: 2023-05-15 | End: 2023-05-15

## 2023-05-15 RX ORDER — CEFAZOLIN SODIUM 1 G
2000 VIAL (EA) INJECTION EVERY 8 HOURS
Refills: 0 | Status: DISCONTINUED | OUTPATIENT
Start: 2023-05-15 | End: 2023-05-27

## 2023-05-15 RX ORDER — AMLODIPINE BESYLATE 2.5 MG/1
10 TABLET ORAL DAILY
Refills: 0 | Status: DISCONTINUED | OUTPATIENT
Start: 2023-05-15 | End: 2023-05-26

## 2023-05-15 RX ORDER — ASPIRIN/CALCIUM CARB/MAGNESIUM 324 MG
81 TABLET ORAL DAILY
Refills: 0 | Status: DISCONTINUED | OUTPATIENT
Start: 2023-05-15 | End: 2023-05-21

## 2023-05-15 RX ORDER — SENNA PLUS 8.6 MG/1
2 TABLET ORAL AT BEDTIME
Refills: 0 | Status: DISCONTINUED | OUTPATIENT
Start: 2023-05-15 | End: 2023-05-24

## 2023-05-15 RX ORDER — TAMSULOSIN HYDROCHLORIDE 0.4 MG/1
0.4 CAPSULE ORAL AT BEDTIME
Refills: 0 | Status: DISCONTINUED | OUTPATIENT
Start: 2023-05-15 | End: 2023-05-27

## 2023-05-15 RX ORDER — ATORVASTATIN CALCIUM 80 MG/1
10 TABLET, FILM COATED ORAL AT BEDTIME
Refills: 0 | Status: DISCONTINUED | OUTPATIENT
Start: 2023-05-15 | End: 2023-05-27

## 2023-05-15 RX ORDER — LIDOCAINE HCL 20 MG/ML
0.2 VIAL (ML) INJECTION ONCE
Refills: 0 | Status: DISCONTINUED | OUTPATIENT
Start: 2023-05-15 | End: 2023-05-15

## 2023-05-15 RX ORDER — LOSARTAN POTASSIUM 100 MG/1
50 TABLET, FILM COATED ORAL DAILY
Refills: 0 | Status: DISCONTINUED | OUTPATIENT
Start: 2023-05-15 | End: 2023-05-26

## 2023-05-15 RX ORDER — OXYCODONE HYDROCHLORIDE 5 MG/1
10 TABLET ORAL EVERY 6 HOURS
Refills: 0 | Status: DISCONTINUED | OUTPATIENT
Start: 2023-05-15 | End: 2023-05-18

## 2023-05-15 RX ORDER — ACETAMINOPHEN 500 MG
650 TABLET ORAL EVERY 6 HOURS
Refills: 0 | Status: DISCONTINUED | OUTPATIENT
Start: 2023-05-15 | End: 2023-05-27

## 2023-05-15 RX ORDER — OXYCODONE HYDROCHLORIDE 5 MG/1
5 TABLET ORAL EVERY 6 HOURS
Refills: 0 | Status: DISCONTINUED | OUTPATIENT
Start: 2023-05-15 | End: 2023-05-15

## 2023-05-15 RX ORDER — ONDANSETRON 8 MG/1
4 TABLET, FILM COATED ORAL EVERY 6 HOURS
Refills: 0 | Status: DISCONTINUED | OUTPATIENT
Start: 2023-05-15 | End: 2023-05-27

## 2023-05-15 RX ORDER — CHLORHEXIDINE GLUCONATE 213 G/1000ML
1 SOLUTION TOPICAL ONCE
Refills: 0 | Status: DISCONTINUED | OUTPATIENT
Start: 2023-05-15 | End: 2023-05-15

## 2023-05-15 RX ORDER — LOSARTAN/HYDROCHLOROTHIAZIDE 100MG-25MG
1 TABLET ORAL
Refills: 0 | DISCHARGE

## 2023-05-15 RX ORDER — HEPARIN SODIUM 5000 [USP'U]/ML
5000 INJECTION INTRAVENOUS; SUBCUTANEOUS EVERY 8 HOURS
Refills: 0 | Status: DISCONTINUED | OUTPATIENT
Start: 2023-05-15 | End: 2023-05-21

## 2023-05-15 RX ORDER — SODIUM CHLORIDE 9 MG/ML
3 INJECTION INTRAMUSCULAR; INTRAVENOUS; SUBCUTANEOUS EVERY 8 HOURS
Refills: 0 | Status: DISCONTINUED | OUTPATIENT
Start: 2023-05-15 | End: 2023-05-15

## 2023-05-15 RX ORDER — HYDROMORPHONE HYDROCHLORIDE 2 MG/ML
0.5 INJECTION INTRAMUSCULAR; INTRAVENOUS; SUBCUTANEOUS
Refills: 0 | Status: DISCONTINUED | OUTPATIENT
Start: 2023-05-15 | End: 2023-05-15

## 2023-05-15 RX ORDER — TAMSULOSIN HYDROCHLORIDE 0.4 MG/1
1 CAPSULE ORAL
Refills: 0 | DISCHARGE

## 2023-05-15 RX ORDER — AMLODIPINE BESYLATE 2.5 MG/1
1 TABLET ORAL
Qty: 0 | Refills: 0 | DISCHARGE

## 2023-05-15 RX ADMIN — SODIUM CHLORIDE 100 MILLILITER(S): 9 INJECTION, SOLUTION INTRAVENOUS at 16:08

## 2023-05-15 RX ADMIN — ONDANSETRON 4 MILLIGRAM(S): 8 TABLET, FILM COATED ORAL at 13:37

## 2023-05-15 RX ADMIN — OXYCODONE HYDROCHLORIDE 10 MILLIGRAM(S): 5 TABLET ORAL at 12:47

## 2023-05-15 RX ADMIN — HEPARIN SODIUM 5000 UNIT(S): 5000 INJECTION INTRAVENOUS; SUBCUTANEOUS at 16:07

## 2023-05-15 RX ADMIN — Medication 100 MILLIGRAM(S): at 16:08

## 2023-05-15 RX ADMIN — TAMSULOSIN HYDROCHLORIDE 0.4 MILLIGRAM(S): 0.4 CAPSULE ORAL at 21:29

## 2023-05-15 RX ADMIN — Medication 81 MILLIGRAM(S): at 12:47

## 2023-05-15 RX ADMIN — ATORVASTATIN CALCIUM 10 MILLIGRAM(S): 80 TABLET, FILM COATED ORAL at 21:29

## 2023-05-15 RX ADMIN — SODIUM CHLORIDE 3 MILLILITER(S): 9 INJECTION INTRAMUSCULAR; INTRAVENOUS; SUBCUTANEOUS at 07:07

## 2023-05-15 RX ADMIN — Medication 400 MILLIGRAM(S): at 16:07

## 2023-05-15 NOTE — PROGRESS NOTE ADULT - SUBJECTIVE AND OBJECTIVE BOX
Post op Check    Pt seen and examined at bedside. Mild suprapubic pain reported, pt states it is tolerable and controlled. Denies SOB/CP/N/V.     Vital Signs Last 24 Hrs  T(C): 36.5 (15 May 2023 11:30), Max: 36.5 (15 May 2023 10:00)  T(F): 97.7 (15 May 2023 11:30), Max: 97.7 (15 May 2023 10:00)  HR: 62 (15 May 2023 11:30) (61 - 75)  BP: 131/83 (15 May 2023 11:30) (129/82 - 151/86)  BP(mean): 102 (15 May 2023 11:30) (101 - 108)  RR: 16 (15 May 2023 11:30) (14 - 16)  SpO2: 97% (15 May 2023 11:30) (97% - 99%)    Parameters below as of 15 May 2023 11:30  Patient On (Oxygen Delivery Method): room air        I&O's Summary    15 May 2023 07:01  -  15 May 2023 11:55  --------------------------------------------------------  IN: 100 mL / OUT: 200 mL / NET: -100 mL  Zuleta in place, draining light yellow fluid  Nephrostomy tube in place, draining dark cherry in color.       Physical Exam  Gen: NAD, A&Ox3  Pulm: No respiratory distress, no subcostal retractions  CV: RRR, no JVD  Abd: Soft, NT, ND  Back: dressing saturated, changed at bedside, nephrostomy tube in place draining dark cherry in color.   : zuleta in place, draining light yellow urine                           14.7   6.01  )-----------( 266      ( 15 May 2023 10:47 )             45.1       05-15    141  |  102  |  13  ----------------------------<  114<H>  4.0   |  27  |  0.69    Ca    8.2<L>      15 May 2023 10:47            Post op Check    Pt seen and examined at bedside. Mild suprapubic pain reported, pt states it is tolerable and controlled. Denies SOB/CP/N/V.     Vital Signs Last 24 Hrs  T(C): 36.5 (15 May 2023 11:30), Max: 36.5 (15 May 2023 10:00)  T(F): 97.7 (15 May 2023 11:30), Max: 97.7 (15 May 2023 10:00)  HR: 62 (15 May 2023 11:30) (61 - 75)  BP: 131/83 (15 May 2023 11:30) (129/82 - 151/86)  BP(mean): 102 (15 May 2023 11:30) (101 - 108)  RR: 16 (15 May 2023 11:30) (14 - 16)  SpO2: 97% (15 May 2023 11:30) (97% - 99%)    Parameters below as of 15 May 2023 11:30  Patient On (Oxygen Delivery Method): room air        I&O's Summary    15 May 2023 07:01  -  15 May 2023 11:55  --------------------------------------------------------  IN: 100 mL / OUT: 200 mL / NET: -100 mL  Zuleta in place, draining light yellow fluid  Nephrostomy tube in place, draining dark cherry in color.       Physical Exam  Gen: NAD, A&Ox3  Pulm: No respiratory distress, no subcostal retractions  CV: RRR, no JVD  Abd: Soft, NT, ND  Back: dressing saturated, changed at bedside, nephrostomy tube in place draining dark cherry in color. no ecchymosis or palpable hematoma.  : zuleta in place, draining light yellow urine                           14.7   6.01  )-----------( 266      ( 15 May 2023 10:47 )             45.1       05-15    141  |  102  |  13  ----------------------------<  114<H>  4.0   |  27  |  0.69    Ca    8.2<L>      15 May 2023 10:47

## 2023-05-15 NOTE — PROGRESS NOTE ADULT - ASSESSMENT
A/P: 62y Male s/p L PCNL POD #0   DVT prophylaxis/OOB  Incentive spirometry  Strict I&O's  Analgesia and antiemetics as needed  Regular diet  AM labs  CT scan in AM  zuleta to remain in place, tomorrow AM TOV  continue Ancef

## 2023-05-15 NOTE — PATIENT PROFILE ADULT - FALL HARM RISK - HARM RISK INTERVENTIONS

## 2023-05-16 LAB
ANION GAP SERPL CALC-SCNC: 10 MMOL/L — SIGNIFICANT CHANGE UP (ref 5–17)
BASOPHILS # BLD AUTO: 0.02 K/UL — SIGNIFICANT CHANGE UP (ref 0–0.2)
BASOPHILS NFR BLD AUTO: 0.2 % — SIGNIFICANT CHANGE UP (ref 0–2)
BUN SERPL-MCNC: 10 MG/DL — SIGNIFICANT CHANGE UP (ref 7–23)
CALCIUM SERPL-MCNC: 8.3 MG/DL — LOW (ref 8.4–10.5)
CHLORIDE SERPL-SCNC: 104 MMOL/L — SIGNIFICANT CHANGE UP (ref 96–108)
CO2 SERPL-SCNC: 26 MMOL/L — SIGNIFICANT CHANGE UP (ref 22–31)
CREAT SERPL-MCNC: 0.67 MG/DL — SIGNIFICANT CHANGE UP (ref 0.5–1.3)
EGFR: 105 ML/MIN/1.73M2 — SIGNIFICANT CHANGE UP
EOSINOPHIL # BLD AUTO: 0.02 K/UL — SIGNIFICANT CHANGE UP (ref 0–0.5)
EOSINOPHIL NFR BLD AUTO: 0.2 % — SIGNIFICANT CHANGE UP (ref 0–6)
GLUCOSE BLDC GLUCOMTR-MCNC: 92 MG/DL — SIGNIFICANT CHANGE UP (ref 70–99)
GLUCOSE SERPL-MCNC: 93 MG/DL — SIGNIFICANT CHANGE UP (ref 70–99)
HCT VFR BLD CALC: 41.8 % — SIGNIFICANT CHANGE UP (ref 39–50)
HGB BLD-MCNC: 14.2 G/DL — SIGNIFICANT CHANGE UP (ref 13–17)
IMM GRANULOCYTES NFR BLD AUTO: 0.3 % — SIGNIFICANT CHANGE UP (ref 0–0.9)
LYMPHOCYTES # BLD AUTO: 1.62 K/UL — SIGNIFICANT CHANGE UP (ref 1–3.3)
LYMPHOCYTES # BLD AUTO: 16.5 % — SIGNIFICANT CHANGE UP (ref 13–44)
MCHC RBC-ENTMCNC: 30.9 PG — SIGNIFICANT CHANGE UP (ref 27–34)
MCHC RBC-ENTMCNC: 34 GM/DL — SIGNIFICANT CHANGE UP (ref 32–36)
MCV RBC AUTO: 91.1 FL — SIGNIFICANT CHANGE UP (ref 80–100)
MONOCYTES # BLD AUTO: 0.77 K/UL — SIGNIFICANT CHANGE UP (ref 0–0.9)
MONOCYTES NFR BLD AUTO: 7.8 % — SIGNIFICANT CHANGE UP (ref 2–14)
NEUTROPHILS # BLD AUTO: 7.37 K/UL — SIGNIFICANT CHANGE UP (ref 1.8–7.4)
NEUTROPHILS NFR BLD AUTO: 75 % — SIGNIFICANT CHANGE UP (ref 43–77)
NRBC # BLD: 0 /100 WBCS — SIGNIFICANT CHANGE UP (ref 0–0)
PLATELET # BLD AUTO: 267 K/UL — SIGNIFICANT CHANGE UP (ref 150–400)
POTASSIUM SERPL-MCNC: 3.7 MMOL/L — SIGNIFICANT CHANGE UP (ref 3.5–5.3)
POTASSIUM SERPL-SCNC: 3.7 MMOL/L — SIGNIFICANT CHANGE UP (ref 3.5–5.3)
RBC # BLD: 4.59 M/UL — SIGNIFICANT CHANGE UP (ref 4.2–5.8)
RBC # FLD: 12.3 % — SIGNIFICANT CHANGE UP (ref 10.3–14.5)
SODIUM SERPL-SCNC: 140 MMOL/L — SIGNIFICANT CHANGE UP (ref 135–145)
WBC # BLD: 9.83 K/UL — SIGNIFICANT CHANGE UP (ref 3.8–10.5)
WBC # FLD AUTO: 9.83 K/UL — SIGNIFICANT CHANGE UP (ref 3.8–10.5)

## 2023-05-16 PROCEDURE — 74176 CT ABD & PELVIS W/O CONTRAST: CPT | Mod: 26

## 2023-05-16 RX ORDER — SODIUM CHLORIDE 9 MG/ML
1000 INJECTION, SOLUTION INTRAVENOUS
Refills: 0 | Status: DISCONTINUED | OUTPATIENT
Start: 2023-05-16 | End: 2023-05-21

## 2023-05-16 RX ORDER — PHENAZOPYRIDINE HCL 100 MG
200 TABLET ORAL EVERY 8 HOURS
Refills: 0 | Status: COMPLETED | OUTPATIENT
Start: 2023-05-16 | End: 2023-05-23

## 2023-05-16 RX ADMIN — ATORVASTATIN CALCIUM 10 MILLIGRAM(S): 80 TABLET, FILM COATED ORAL at 21:23

## 2023-05-16 RX ADMIN — Medication 100 MILLIGRAM(S): at 08:02

## 2023-05-16 RX ADMIN — HEPARIN SODIUM 5000 UNIT(S): 5000 INJECTION INTRAVENOUS; SUBCUTANEOUS at 00:34

## 2023-05-16 RX ADMIN — LOSARTAN POTASSIUM 50 MILLIGRAM(S): 100 TABLET, FILM COATED ORAL at 05:07

## 2023-05-16 RX ADMIN — TAMSULOSIN HYDROCHLORIDE 0.4 MILLIGRAM(S): 0.4 CAPSULE ORAL at 21:23

## 2023-05-16 RX ADMIN — HEPARIN SODIUM 5000 UNIT(S): 5000 INJECTION INTRAVENOUS; SUBCUTANEOUS at 08:02

## 2023-05-16 RX ADMIN — Medication 100 MILLIGRAM(S): at 00:33

## 2023-05-16 RX ADMIN — Medication 650 MILLIGRAM(S): at 13:15

## 2023-05-16 RX ADMIN — Medication 81 MILLIGRAM(S): at 13:15

## 2023-05-16 RX ADMIN — AMLODIPINE BESYLATE 10 MILLIGRAM(S): 2.5 TABLET ORAL at 05:07

## 2023-05-16 RX ADMIN — Medication 650 MILLIGRAM(S): at 14:00

## 2023-05-16 RX ADMIN — HEPARIN SODIUM 5000 UNIT(S): 5000 INJECTION INTRAVENOUS; SUBCUTANEOUS at 16:52

## 2023-05-16 RX ADMIN — Medication 100 MILLIGRAM(S): at 16:51

## 2023-05-16 NOTE — PROGRESS NOTE ADULT - ASSESSMENT
62 year old Male POD #1 s/l L PCNL  -zuleta removed, TOV this morning  -regular diet  -f/u CT scan  -analgesia and antiemetics PRN  -continue Ancef   -f/u AM labs

## 2023-05-16 NOTE — PROGRESS NOTE ADULT - SUBJECTIVE AND OBJECTIVE BOX
UROLOGY DAILY PROGRESS NOTE:     Subjective: Patient seen and examined at bedside. No overnight events or current complaints.       Objective:  Vital signs  T(F): , Max: 98.6 (05-15-23 @ 15:30)  HR: 80 (05-16-23 @ 04:32)  BP: 124/78 (05-16-23 @ 04:32)  SpO2: 96% (05-16-23 @ 04:32)  Wt(kg): --    I&O's Summary    15 May 2023 07:01  -  16 May 2023 07:00  --------------------------------------------------------  IN: 1350 mL / OUT: 3650 mL / NET: -2300 ml  left nephrostomy tube: draining bright red fluid    Gen: NAD  Pulm: No respiratory distress, no subcostal retractions  CV: RRR, no JVD  Abd: Soft, NT, ND  : zuleta removed  back: left nephrostomy tube draining bright red fluid, no ecchymosis or palpable hematoma. dressing saturated and changed at bedside.    Labs:  05-15  6.01  / 45.1  /0.69                           14.7   6.01  )-----------( 266      ( 15 May 2023 10:47 )             45.1     05-15    141  |  102  |  13  ----------------------------<  114<H>  4.0   |  27  |  0.69    Ca    8.2<L>      15 May 2023 10:47

## 2023-05-17 LAB
ANION GAP SERPL CALC-SCNC: 15 MMOL/L — SIGNIFICANT CHANGE UP (ref 5–17)
BUN SERPL-MCNC: 12 MG/DL — SIGNIFICANT CHANGE UP (ref 7–23)
CALCIUM SERPL-MCNC: 8.5 MG/DL — SIGNIFICANT CHANGE UP (ref 8.4–10.5)
CHLORIDE SERPL-SCNC: 101 MMOL/L — SIGNIFICANT CHANGE UP (ref 96–108)
CO2 SERPL-SCNC: 24 MMOL/L — SIGNIFICANT CHANGE UP (ref 22–31)
CREAT SERPL-MCNC: 0.69 MG/DL — SIGNIFICANT CHANGE UP (ref 0.5–1.3)
CULTURE RESULTS: NO GROWTH — SIGNIFICANT CHANGE UP
CULTURE RESULTS: NO GROWTH — SIGNIFICANT CHANGE UP
CULTURE RESULTS: SIGNIFICANT CHANGE UP
EGFR: 104 ML/MIN/1.73M2 — SIGNIFICANT CHANGE UP
GLUCOSE SERPL-MCNC: 93 MG/DL — SIGNIFICANT CHANGE UP (ref 70–99)
HCT VFR BLD CALC: 39.4 % — SIGNIFICANT CHANGE UP (ref 39–50)
HGB BLD-MCNC: 13.5 G/DL — SIGNIFICANT CHANGE UP (ref 13–17)
MCHC RBC-ENTMCNC: 31.2 PG — SIGNIFICANT CHANGE UP (ref 27–34)
MCHC RBC-ENTMCNC: 34.3 GM/DL — SIGNIFICANT CHANGE UP (ref 32–36)
MCV RBC AUTO: 91 FL — SIGNIFICANT CHANGE UP (ref 80–100)
NRBC # BLD: 0 /100 WBCS — SIGNIFICANT CHANGE UP (ref 0–0)
PLATELET # BLD AUTO: 258 K/UL — SIGNIFICANT CHANGE UP (ref 150–400)
POTASSIUM SERPL-MCNC: 3.4 MMOL/L — LOW (ref 3.5–5.3)
POTASSIUM SERPL-SCNC: 3.4 MMOL/L — LOW (ref 3.5–5.3)
RBC # BLD: 4.33 M/UL — SIGNIFICANT CHANGE UP (ref 4.2–5.8)
RBC # FLD: 12 % — SIGNIFICANT CHANGE UP (ref 10.3–14.5)
SODIUM SERPL-SCNC: 140 MMOL/L — SIGNIFICANT CHANGE UP (ref 135–145)
SPECIMEN SOURCE: SIGNIFICANT CHANGE UP
SURGICAL PATHOLOGY STUDY: SIGNIFICANT CHANGE UP
WBC # BLD: 10.03 K/UL — SIGNIFICANT CHANGE UP (ref 3.8–10.5)
WBC # FLD AUTO: 10.03 K/UL — SIGNIFICANT CHANGE UP (ref 3.8–10.5)

## 2023-05-17 RX ORDER — POTASSIUM CHLORIDE 20 MEQ
20 PACKET (EA) ORAL ONCE
Refills: 0 | Status: COMPLETED | OUTPATIENT
Start: 2023-05-17 | End: 2023-05-17

## 2023-05-17 RX ADMIN — Medication 100 MILLIGRAM(S): at 07:56

## 2023-05-17 RX ADMIN — AMLODIPINE BESYLATE 10 MILLIGRAM(S): 2.5 TABLET ORAL at 05:20

## 2023-05-17 RX ADMIN — Medication 20 MILLIEQUIVALENT(S): at 13:23

## 2023-05-17 RX ADMIN — Medication 200 MILLIGRAM(S): at 07:54

## 2023-05-17 RX ADMIN — ATORVASTATIN CALCIUM 10 MILLIGRAM(S): 80 TABLET, FILM COATED ORAL at 21:57

## 2023-05-17 RX ADMIN — OXYCODONE HYDROCHLORIDE 10 MILLIGRAM(S): 5 TABLET ORAL at 00:40

## 2023-05-17 RX ADMIN — Medication 650 MILLIGRAM(S): at 14:10

## 2023-05-17 RX ADMIN — OXYCODONE HYDROCHLORIDE 10 MILLIGRAM(S): 5 TABLET ORAL at 01:10

## 2023-05-17 RX ADMIN — HEPARIN SODIUM 5000 UNIT(S): 5000 INJECTION INTRAVENOUS; SUBCUTANEOUS at 17:33

## 2023-05-17 RX ADMIN — Medication 650 MILLIGRAM(S): at 07:54

## 2023-05-17 RX ADMIN — Medication 81 MILLIGRAM(S): at 13:24

## 2023-05-17 RX ADMIN — OXYCODONE HYDROCHLORIDE 10 MILLIGRAM(S): 5 TABLET ORAL at 06:39

## 2023-05-17 RX ADMIN — Medication 100 MILLIGRAM(S): at 17:20

## 2023-05-17 RX ADMIN — Medication 200 MILLIGRAM(S): at 18:33

## 2023-05-17 RX ADMIN — Medication 100 MILLIGRAM(S): at 00:40

## 2023-05-17 RX ADMIN — Medication 650 MILLIGRAM(S): at 13:22

## 2023-05-17 RX ADMIN — HEPARIN SODIUM 5000 UNIT(S): 5000 INJECTION INTRAVENOUS; SUBCUTANEOUS at 00:40

## 2023-05-17 RX ADMIN — Medication 650 MILLIGRAM(S): at 09:03

## 2023-05-17 RX ADMIN — HEPARIN SODIUM 5000 UNIT(S): 5000 INJECTION INTRAVENOUS; SUBCUTANEOUS at 09:03

## 2023-05-17 RX ADMIN — OXYCODONE HYDROCHLORIDE 10 MILLIGRAM(S): 5 TABLET ORAL at 07:35

## 2023-05-17 RX ADMIN — LOSARTAN POTASSIUM 50 MILLIGRAM(S): 100 TABLET, FILM COATED ORAL at 05:20

## 2023-05-17 RX ADMIN — TAMSULOSIN HYDROCHLORIDE 0.4 MILLIGRAM(S): 0.4 CAPSULE ORAL at 21:57

## 2023-05-17 NOTE — PROGRESS NOTE ADULT - ASSESSMENT
62 year old Male POD #2 s/l L PCNL  - monitor urine color  - if color improves will dc NT  -regular diet  -analgesia and antiemetics PRN  -continue Ancef   -f/u labs  - dc planning

## 2023-05-17 NOTE — PROGRESS NOTE ADULT - SUBJECTIVE AND OBJECTIVE BOX
UROLOGY DAILY PROGRESS NOTE:     Subjective: Patient seen and examined at bedside. No overnight events.       Objective:  Vital signs  T(F): , Max: 100.1 (05-16-23 @ 20:14)  HR: 88 (05-17-23 @ 04:49)  BP: 144/85 (05-17-23 @ 04:49)  SpO2: 98% (05-17-23 @ 04:49)  Wt(kg): --    I&O's Summary    16 May 2023 07:01  -  17 May 2023 07:00  --------------------------------------------------------  IN: 2330 mL / OUT: 2750 mL / NET: -420 mL        Gen: NAD  Pulm: No respiratory distress, no subcostal retractions  CV: RRR, no JVD  Abd: Soft, NT, ND  Back: L NT draining maroon urine   : voided urine dark red     Labs:  05-16  9.83  / 41.8  /0.67   05-15  6.01  / 45.1  /0.69                           14.2   9.83  )-----------( 267      ( 16 May 2023 07:17 )             41.8     05-16    140  |  104  |  10  ----------------------------<  93  3.7   |  26  |  0.67    Ca    8.3<L>      16 May 2023 07:17          Urine Cx:

## 2023-05-18 LAB
ANION GAP SERPL CALC-SCNC: 11 MMOL/L — SIGNIFICANT CHANGE UP (ref 5–17)
APTT BLD: 26.4 SEC — LOW (ref 27.5–35.5)
BUN SERPL-MCNC: 11 MG/DL — SIGNIFICANT CHANGE UP (ref 7–23)
CALCIUM SERPL-MCNC: 8.2 MG/DL — LOW (ref 8.4–10.5)
CHLORIDE SERPL-SCNC: 95 MMOL/L — LOW (ref 96–108)
CO2 SERPL-SCNC: 28 MMOL/L — SIGNIFICANT CHANGE UP (ref 22–31)
CREAT SERPL-MCNC: 0.82 MG/DL — SIGNIFICANT CHANGE UP (ref 0.5–1.3)
EGFR: 99 ML/MIN/1.73M2 — SIGNIFICANT CHANGE UP
GLUCOSE SERPL-MCNC: 98 MG/DL — SIGNIFICANT CHANGE UP (ref 70–99)
HCT VFR BLD CALC: 34.8 % — LOW (ref 39–50)
HGB BLD-MCNC: 11.6 G/DL — LOW (ref 13–17)
INR BLD: 1.08 RATIO — SIGNIFICANT CHANGE UP (ref 0.88–1.16)
MCHC RBC-ENTMCNC: 30.7 PG — SIGNIFICANT CHANGE UP (ref 27–34)
MCHC RBC-ENTMCNC: 33.3 GM/DL — SIGNIFICANT CHANGE UP (ref 32–36)
MCV RBC AUTO: 92.1 FL — SIGNIFICANT CHANGE UP (ref 80–100)
NRBC # BLD: 0 /100 WBCS — SIGNIFICANT CHANGE UP (ref 0–0)
PLATELET # BLD AUTO: 251 K/UL — SIGNIFICANT CHANGE UP (ref 150–400)
POTASSIUM SERPL-MCNC: 3.6 MMOL/L — SIGNIFICANT CHANGE UP (ref 3.5–5.3)
POTASSIUM SERPL-SCNC: 3.6 MMOL/L — SIGNIFICANT CHANGE UP (ref 3.5–5.3)
PROTHROM AB SERPL-ACNC: 12.4 SEC — SIGNIFICANT CHANGE UP (ref 10.5–13.4)
RBC # BLD: 3.78 M/UL — LOW (ref 4.2–5.8)
RBC # FLD: 11.8 % — SIGNIFICANT CHANGE UP (ref 10.3–14.5)
SODIUM SERPL-SCNC: 134 MMOL/L — LOW (ref 135–145)
WBC # BLD: 9.77 K/UL — SIGNIFICANT CHANGE UP (ref 3.8–10.5)
WBC # FLD AUTO: 9.77 K/UL — SIGNIFICANT CHANGE UP (ref 3.8–10.5)

## 2023-05-18 PROCEDURE — 76937 US GUIDE VASCULAR ACCESS: CPT | Mod: 26

## 2023-05-18 PROCEDURE — 37244 VASC EMBOLIZE/OCCLUDE BLEED: CPT

## 2023-05-18 PROCEDURE — 36253 INS CATH REN ART 2ND+ UNILAT: CPT | Mod: LT

## 2023-05-18 RX ORDER — HYDROMORPHONE HYDROCHLORIDE 2 MG/ML
0.5 INJECTION INTRAMUSCULAR; INTRAVENOUS; SUBCUTANEOUS
Refills: 0 | Status: DISCONTINUED | OUTPATIENT
Start: 2023-05-18 | End: 2023-05-18

## 2023-05-18 RX ORDER — CALCIUM CARBONATE 500(1250)
1 TABLET ORAL EVERY 6 HOURS
Refills: 0 | Status: DISCONTINUED | OUTPATIENT
Start: 2023-05-18 | End: 2023-05-27

## 2023-05-18 RX ORDER — ONDANSETRON 8 MG/1
4 TABLET, FILM COATED ORAL ONCE
Refills: 0 | Status: DISCONTINUED | OUTPATIENT
Start: 2023-05-18 | End: 2023-05-18

## 2023-05-18 RX ORDER — POLYETHYLENE GLYCOL 3350 17 G/17G
17 POWDER, FOR SOLUTION ORAL ONCE
Refills: 0 | Status: COMPLETED | OUTPATIENT
Start: 2023-05-18 | End: 2023-05-18

## 2023-05-18 RX ORDER — HYDROMORPHONE HYDROCHLORIDE 2 MG/ML
1 INJECTION INTRAMUSCULAR; INTRAVENOUS; SUBCUTANEOUS
Refills: 0 | Status: DISCONTINUED | OUTPATIENT
Start: 2023-05-18 | End: 2023-05-18

## 2023-05-18 RX ADMIN — SODIUM CHLORIDE 75 MILLILITER(S): 9 INJECTION, SOLUTION INTRAVENOUS at 09:20

## 2023-05-18 RX ADMIN — LOSARTAN POTASSIUM 50 MILLIGRAM(S): 100 TABLET, FILM COATED ORAL at 05:28

## 2023-05-18 RX ADMIN — Medication 100 MILLIGRAM(S): at 09:20

## 2023-05-18 RX ADMIN — HEPARIN SODIUM 5000 UNIT(S): 5000 INJECTION INTRAVENOUS; SUBCUTANEOUS at 01:16

## 2023-05-18 RX ADMIN — AMLODIPINE BESYLATE 10 MILLIGRAM(S): 2.5 TABLET ORAL at 05:28

## 2023-05-18 RX ADMIN — Medication 81 MILLIGRAM(S): at 12:14

## 2023-05-18 RX ADMIN — Medication 1 TABLET(S): at 09:19

## 2023-05-18 RX ADMIN — TAMSULOSIN HYDROCHLORIDE 0.4 MILLIGRAM(S): 0.4 CAPSULE ORAL at 22:13

## 2023-05-18 RX ADMIN — OXYCODONE HYDROCHLORIDE 10 MILLIGRAM(S): 5 TABLET ORAL at 02:15

## 2023-05-18 RX ADMIN — ATORVASTATIN CALCIUM 10 MILLIGRAM(S): 80 TABLET, FILM COATED ORAL at 22:13

## 2023-05-18 RX ADMIN — OXYCODONE HYDROCHLORIDE 10 MILLIGRAM(S): 5 TABLET ORAL at 06:31

## 2023-05-18 RX ADMIN — OXYCODONE HYDROCHLORIDE 10 MILLIGRAM(S): 5 TABLET ORAL at 01:15

## 2023-05-18 RX ADMIN — POLYETHYLENE GLYCOL 3350 17 GRAM(S): 17 POWDER, FOR SOLUTION ORAL at 12:15

## 2023-05-18 RX ADMIN — Medication 100 MILLIGRAM(S): at 01:15

## 2023-05-18 NOTE — PROGRESS NOTE ADULT - ASSESSMENT
A/P: 62 year old male s/p L PCNL POD#3    - AM labs  - Diet  - Ancef  - pain control prn  - Encourage OOB/ambulate  - Encourage IS  - IR consult for NT removal and angio  - Monitor NT and urine output color  - c/w home meds  - DVT ppx

## 2023-05-18 NOTE — PROGRESS NOTE ADULT - ASSESSMENT
A/P: 62y Male s/p IR L angiogram and embolization, replacement of NT     -DVT prophylaxis/OOB  -Incentive spirometry  -Strict I&O's  -Analgesia and antiemetics as needed  -Regular Diet  -AM labs  -Ancef

## 2023-05-18 NOTE — PRE-ANESTHESIA EVALUATION ADULT - NSANTHPEFT_GEN_ALL_CORE
Gen: No acute distress, fatigued appearing  Pulm: breathing comfortably on room air  Cor: regular rate

## 2023-05-18 NOTE — PROGRESS NOTE ADULT - SUBJECTIVE AND OBJECTIVE BOX
Urology PA Progress Note    Subjective:  Patient seen and examined at bedside. No acute events overnight    Objective:  Vital signs  T(F): , Max: 99.7 (05-17-23 @ 09:21)  HR: 87 (05-18-23 @ 04:51)  BP: 156/86 (05-18-23 @ 04:51)  SpO2: 97% (05-18-23 @ 04:51)  Wt(kg): --    Output     05-17 @ 07:01  -  05-18 @ 07:00  --------------------------------------------------------  IN: 2620 mL / OUT: 1500 mL / NET: 1120 mL      Void 800cc  NT 200cc    Physical Exam:  Gen: NAD. AAOx3  Pulm: nonlabored  Abd: soft, NT/ND  : voiding dark red urine with clots, left NT in place draining dark red    Labs:  05-17  10.03 / 39.4  /0.69                           13.5   10.03 )-----------( 258      ( 17 May 2023 07:59 )             39.4     05-17    140  |  101  |  12  ----------------------------<  93  3.4<L>   |  24  |  0.69    Ca    8.5      17 May 2023 07:59      Cx: .Other Other  05-15 @ 14:36   No growth at 48 hours  --  --      Clean Catch Clean Catch (Midstream)  04-24 @ 09:45   <10,000 CFU/mL Normal Urogenital Herminia  --  --

## 2023-05-18 NOTE — PROGRESS NOTE ADULT - SUBJECTIVE AND OBJECTIVE BOX
Post op Check    Pt seen and examined without complaints. Pain is controlled. Denies SOB/CP/N/V. Voided 400cc red urine without clots.     Vital Signs Last 24 Hrs  T(C): 36.6 (18 May 2023 21:30), Max: 37.3 (18 May 2023 00:52)  T(F): 97.9 (18 May 2023 21:30), Max: 99.2 (18 May 2023 00:52)  HR: 83 (18 May 2023 21:30) (77 - 107)  BP: 125/66 (18 May 2023 21:30) (118/68 - 163/97)  BP(mean): 88 (18 May 2023 21:30) (87 - 102)  RR: 14 (18 May 2023 21:30) (12 - 18)  SpO2: 98% (18 May 2023 21:30) (97% - 100%)    Parameters below as of 18 May 2023 19:38  Patient On (Oxygen Delivery Method): room air        I&O's Summary    17 May 2023 07:01  -  18 May 2023 07:00  --------------------------------------------------------  IN: 2620 mL / OUT: 1500 mL / NET: 1120 mL    18 May 2023 07:01  -  18 May 2023 22:01  --------------------------------------------------------  IN: 150 mL / OUT: 440 mL / NET: -290 mL    I&O's Detail    17 May 2023 07:01  -  18 May 2023 07:00  --------------------------------------------------------  IN:    IV PiggyBack: 100 mL    Lactated Ringers: 1800 mL    Oral Fluid: 720 mL  Total IN: 2620 mL    OUT:    Nephrostomy Tube (mL): 300 mL    Voided (mL): 1200 mL  Total OUT: 1500 mL    Total NET: 1120 mL      18 May 2023 07:01  -  18 May 2023 22:01  --------------------------------------------------------  IN:    Lactated Ringers: 150 mL  Total IN: 150 mL    OUT:    Nephrostomy Tube (mL): 40 mL    Voided (mL): 400 mL  Total OUT: 440 mL    Total NET: -290 mL          Physical Exam  Gen: awake alert NAD AXOX3  Pulm: No respiratory distress, no subcostal retractions  CV: RRR, no JVD  Abd: Soft, NT, ND  Back: L flank nontender to palpation, no palpable hematoma or ecchymosis, no active bleeding from site, L NT with dark red output   : voiding, R groin dressing C/D/I, no palpable hematoma or ecchymosis, no active bleeding                           11.6   9.77  )-----------( 251      ( 18 May 2023 08:10 )             34.8       05-18    134<L>  |  95<L>  |  11  ----------------------------<  98  3.6   |  28  |  0.82    Ca    8.2<L>      18 May 2023 08:09

## 2023-05-18 NOTE — CONSULT NOTE ADULT - SUBJECTIVE AND OBJECTIVE BOX
Interventional Radiology    Evaluate for Procedure: nephrostomy tube removal and possible renal angio    HPI: 62y Male with PMH HTN, HLD, 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 Patient now POD#3, voiding dark red urine, left NT with dark red output with clots. IR consulted for nephrostomy tube removal with possible renal angio.     Allergies: No Known Allergies    Medications (Abx/Cardiac/Anticoagulation/Blood Products)  amLODIPine   Tablet: 10 milliGRAM(s) Oral (05-18 @ 05:28)  aspirin  chewable: 81 milliGRAM(s) Oral (05-17 @ 13:24)  ceFAZolin   IVPB: 100 mL/Hr IV Intermittent (05-18 @ 01:15)  heparin   Injectable: 5000 Unit(s) SubCutaneous (05-18 @ 01:16)  hydrochlorothiazide: 12.5 milliGRAM(s) Oral (05-18 @ 05:28)  losartan: 50 milliGRAM(s) Oral (05-18 @ 05:28)    Data:    T(C): 37.1  HR: 87  BP: 156/86  RR: 18  SpO2: 97%    -WBC 10.03 / HgB 13.5 / Hct 39.4 / Plt 258  -Na 140 / Cl 101 / BUN 12 / Glucose 93  -K 3.4 / CO2 24 / Cr 0.69  -ALT -- / Alk Phos -- / T.Bili --    Radiology: Reviewed    Assessment/Plan:   -62y Male with PMH HTN, HLD, 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 Patient now POD#3, voiding dark red urine, left NT with dark red output with clots. IR consulted for nephrostomy tube removal with possible renal angio.       - Case reviewed and approved for left nephrostomy tube removal today 5/18  - please place IR procedure order under Dr. Foster  - STAT labs in AM (cbc,coags, bmp, T&S)  - hold AC on day of procedure  - Please keep NPO  - d/w primary team

## 2023-05-19 ENCOUNTER — TRANSCRIPTION ENCOUNTER (OUTPATIENT)
Age: 63
End: 2023-05-19

## 2023-05-19 LAB
ANION GAP SERPL CALC-SCNC: 12 MMOL/L — SIGNIFICANT CHANGE UP (ref 5–17)
BUN SERPL-MCNC: 14 MG/DL — SIGNIFICANT CHANGE UP (ref 7–23)
CALCIUM SERPL-MCNC: 8.4 MG/DL — SIGNIFICANT CHANGE UP (ref 8.4–10.5)
CHLORIDE SERPL-SCNC: 99 MMOL/L — SIGNIFICANT CHANGE UP (ref 96–108)
CO2 SERPL-SCNC: 25 MMOL/L — SIGNIFICANT CHANGE UP (ref 22–31)
CREAT SERPL-MCNC: 0.83 MG/DL — SIGNIFICANT CHANGE UP (ref 0.5–1.3)
EGFR: 98 ML/MIN/1.73M2 — SIGNIFICANT CHANGE UP
GLUCOSE SERPL-MCNC: 117 MG/DL — HIGH (ref 70–99)
HCT VFR BLD CALC: 27.8 % — LOW (ref 39–50)
HCT VFR BLD CALC: 30.3 % — LOW (ref 39–50)
HGB BLD-MCNC: 10.3 G/DL — LOW (ref 13–17)
HGB BLD-MCNC: 9.5 G/DL — LOW (ref 13–17)
MCHC RBC-ENTMCNC: 30.9 PG — SIGNIFICANT CHANGE UP (ref 27–34)
MCHC RBC-ENTMCNC: 30.9 PG — SIGNIFICANT CHANGE UP (ref 27–34)
MCHC RBC-ENTMCNC: 34 GM/DL — SIGNIFICANT CHANGE UP (ref 32–36)
MCHC RBC-ENTMCNC: 34.2 GM/DL — SIGNIFICANT CHANGE UP (ref 32–36)
MCV RBC AUTO: 90.6 FL — SIGNIFICANT CHANGE UP (ref 80–100)
MCV RBC AUTO: 91 FL — SIGNIFICANT CHANGE UP (ref 80–100)
NRBC # BLD: 0 /100 WBCS — SIGNIFICANT CHANGE UP (ref 0–0)
NRBC # BLD: 0 /100 WBCS — SIGNIFICANT CHANGE UP (ref 0–0)
PLATELET # BLD AUTO: 237 K/UL — SIGNIFICANT CHANGE UP (ref 150–400)
PLATELET # BLD AUTO: 282 K/UL — SIGNIFICANT CHANGE UP (ref 150–400)
POTASSIUM SERPL-MCNC: 4.4 MMOL/L — SIGNIFICANT CHANGE UP (ref 3.5–5.3)
POTASSIUM SERPL-SCNC: 4.4 MMOL/L — SIGNIFICANT CHANGE UP (ref 3.5–5.3)
RBC # BLD: 3.07 M/UL — LOW (ref 4.2–5.8)
RBC # BLD: 3.33 M/UL — LOW (ref 4.2–5.8)
RBC # FLD: 11.6 % — SIGNIFICANT CHANGE UP (ref 10.3–14.5)
RBC # FLD: 11.7 % — SIGNIFICANT CHANGE UP (ref 10.3–14.5)
SODIUM SERPL-SCNC: 136 MMOL/L — SIGNIFICANT CHANGE UP (ref 135–145)
WBC # BLD: 10.21 K/UL — SIGNIFICANT CHANGE UP (ref 3.8–10.5)
WBC # BLD: 7.22 K/UL — SIGNIFICANT CHANGE UP (ref 3.8–10.5)
WBC # FLD AUTO: 10.21 K/UL — SIGNIFICANT CHANGE UP (ref 3.8–10.5)
WBC # FLD AUTO: 7.22 K/UL — SIGNIFICANT CHANGE UP (ref 3.8–10.5)

## 2023-05-19 PROCEDURE — 99232 SBSQ HOSP IP/OBS MODERATE 35: CPT

## 2023-05-19 RX ADMIN — HEPARIN SODIUM 5000 UNIT(S): 5000 INJECTION INTRAVENOUS; SUBCUTANEOUS at 08:46

## 2023-05-19 RX ADMIN — Medication 100 MILLIGRAM(S): at 16:53

## 2023-05-19 RX ADMIN — SODIUM CHLORIDE 75 MILLILITER(S): 9 INJECTION, SOLUTION INTRAVENOUS at 21:12

## 2023-05-19 RX ADMIN — AMLODIPINE BESYLATE 10 MILLIGRAM(S): 2.5 TABLET ORAL at 05:04

## 2023-05-19 RX ADMIN — SODIUM CHLORIDE 75 MILLILITER(S): 9 INJECTION, SOLUTION INTRAVENOUS at 08:47

## 2023-05-19 RX ADMIN — HEPARIN SODIUM 5000 UNIT(S): 5000 INJECTION INTRAVENOUS; SUBCUTANEOUS at 00:32

## 2023-05-19 RX ADMIN — ATORVASTATIN CALCIUM 10 MILLIGRAM(S): 80 TABLET, FILM COATED ORAL at 21:13

## 2023-05-19 RX ADMIN — Medication 100 MILLIGRAM(S): at 08:45

## 2023-05-19 RX ADMIN — Medication 81 MILLIGRAM(S): at 12:17

## 2023-05-19 RX ADMIN — TAMSULOSIN HYDROCHLORIDE 0.4 MILLIGRAM(S): 0.4 CAPSULE ORAL at 21:13

## 2023-05-19 RX ADMIN — LOSARTAN POTASSIUM 50 MILLIGRAM(S): 100 TABLET, FILM COATED ORAL at 05:05

## 2023-05-19 RX ADMIN — Medication 100 MILLIGRAM(S): at 00:33

## 2023-05-19 RX ADMIN — HEPARIN SODIUM 5000 UNIT(S): 5000 INJECTION INTRAVENOUS; SUBCUTANEOUS at 16:54

## 2023-05-19 NOTE — DISCHARGE NOTE NURSING/CASE MANAGEMENT/SOCIAL WORK - NSDCPEFALRISK_GEN_ALL_CORE
For information on Fall & Injury Prevention, visit: [email or web address] OR  [email or web address] OR  https://www.cdc.gov/steadi/patient.html

## 2023-05-19 NOTE — PROGRESS NOTE ADULT - ASSESSMENT
A/P: 62y Male s/pL PCNL, post op hematuria, s/p IR L angiogram and embolization, replacement of NT     -DVT prophylaxis/OOB  -Incentive spirometry  -  monitor urine output and color  -Analgesia and antiemetics as needed  -Regular Diet  --Ancef

## 2023-05-19 NOTE — DISCHARGE NOTE NURSING/CASE MANAGEMENT/SOCIAL WORK - PATIENT PORTAL LINK FT
You can access the FollowMyHealth Patient Portal offered by [facility] by registering at the following website: [email or web address]. By joining [employer]’s FollowMyHealth portal, you will also be able to view your health information using other applications (apps) compatible with our system.

## 2023-05-19 NOTE — PROGRESS NOTE ADULT - SUBJECTIVE AND OBJECTIVE BOX
Interventional Radiology Follow-Up Note.    Patient seen and examined @ bedside.    This is a 62y Male s/p Renal angiogram and embolization with exchange of left nephrostomy drain on 5/18/23 in Interventional Radiology with Dr. Halaibeh.     No complaint offered. Patient had reported blood around insertion site of drain this AM, ostomy bag placed around drain, no further leaking around site seen. Urine is blood-tinged.      Medication:  amLODIPine   Tablet: (05-19)  aspirin  chewable: (05-18)  ceFAZolin   IVPB: (05-19)  heparin   Injectable: (05-19)  hydrochlorothiazide: (05-19)  losartan: (05-19)    Vitals:  T(F): 97.9, Max: 98.8 (13:28)  HR: 78  BP: 119/73  RR: 18  SpO2: 97%    Physical Exam:  General: Nontoxic, in NAD.  Abdomen: soft, NTND.   Extremities:  L radial site dry and intact, soft with no evidence of hematoma.   R groin site clean, dry and intact, soft with no evidence of hematoma, R femoral pulse 2+, b/l dp/pt pulses 2+. No pedal edema or calf tenderness noted.    Drain Device: Drain intact with ostomy bag placed around insertion site.  No leaking around site seen. Heme-tinged urine seen in bag. no ttp.  24hr Drain output: 315cc          LABS:  Na: 136 (05-19 @ 06:40), 134 (05-18 @ 08:09), 140 (05-17 @ 07:59)  K: 4.4 (05-19 @ 06:40), 3.6 (05-18 @ 08:09), 3.4 (05-17 @ 07:59)  Cl: 99 (05-19 @ 06:40), 95 (05-18 @ 08:09), 101 (05-17 @ 07:59)  CO2: 25 (05-19 @ 06:40), 28 (05-18 @ 08:09), 24 (05-17 @ 07:59)  BUN: 14 (05-19 @ 06:40), 11 (05-18 @ 08:09), 12 (05-17 @ 07:59)  Cr: 0.83 (05-19 @ 06:40), 0.82 (05-18 @ 08:09), 0.69 (05-17 @ 07:59)  Glu: 117(05-19 @ 06:40), 98(05-18 @ 08:09), 93(05-17 @ 07:59)    Hgb: 9.5 (05-19 @ 06:42), 11.6 (05-18 @ 08:10), 13.5 (05-17 @ 07:59)  Hct: 27.8 (05-19 @ 06:42), 34.8 (05-18 @ 08:10), 39.4 (05-17 @ 07:59)  WBC: 7.22 (05-19 @ 06:42), 9.77 (05-18 @ 08:10), 10.03 (05-17 @ 07:59)  Plt: 237 (05-19 @ 06:42), 251 (05-18 @ 08:10), 258 (05-17 @ 07:59)    INR: 1.08 05-18-23 @ 10:01  PTT: 26.4 05-18-23 @ 10:01                        Assessment/Plan:  62y Male with PMH HTN, HLD, 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 Patient voiding dark red urine, left NT with dark red output with clots.   Pt most recently s/p Renal angiogram and embolization with exchange of left nephrostomy drain on 5/18/23 in Interventional Radiology .     5/18 s/p Left radial access renal angiogram demonstrating irregular vessel at suspected site of bleed. TR Band placed.  Right femoral access renal angiogram with subselective gelfoam embolization. Angioseal.  Removal of indwelling nephrostomy tube/stent with replacement of 14Fr Pickering Hancock nephrostomy tube with pigtail in collecting system.  - Patient had reported blood around insertion site of drain this AM, ostomy bag placed around drain by urology team, no further leaking around site seen. Urine is blood-tinged.  - Flush drain with 5cc normal saline daily forward only; DO NOT aspirate  - Change dressing q3 days or when dressing is saturated.  - Trend vs/labs- H/H 9.5/27.8 from 11.6/34.8, continue to trend  - Continue global management per primary team and urology. appreciate recs.     Please call IR at [phone] with any questions, concerns, or issues regarding above.    Also available on [employer].

## 2023-05-19 NOTE — PROGRESS NOTE ADULT - SUBJECTIVE AND OBJECTIVE BOX
UROLOGY DAILY PROGRESS NOTE:     Subjective: Patient seen and examined at bedside. Had angioembolization by IR yesterday.  Denies pain or fever.       Objective:  Vital signs  T(F): , Max: 98.8 (05-18-23 @ 13:28)  HR: 94 (05-19-23 @ 04:40)  BP: 113/69 (05-19-23 @ 04:40)  SpO2: 96% (05-19-23 @ 04:40)  Wt(kg): --    I&O's Summary    18 May 2023 07:01  -  19 May 2023 07:00  --------------------------------------------------------  IN: 875 mL / OUT: 1015 mL / NET: -140 mL        Gen: NAD  Pulm: No respiratory distress, no subcostal retractions  CV: RRR, no JVD  Abd: Soft, NT, ND  Back: L NT - scant bloody urine, flushed with 10cc NS,  dressing saturated, pouch placed     Labs:  05-19  7.22  / 27.8  /x      05-19  x     / x     /0.83                           9.5    7.22  )-----------( 237      ( 19 May 2023 06:42 )             27.8     05-19    136  |  99  |  14  ----------------------------<  117<H>  4.4   |  25  |  0.83    Ca    8.4      19 May 2023 06:40      PT/INR - ( 18 May 2023 10:01 )   PT: 12.4 sec;   INR: 1.08 ratio         PTT - ( 18 May 2023 10:01 )  PTT:26.4 sec    Urine Cx:

## 2023-05-20 ENCOUNTER — TRANSCRIPTION ENCOUNTER (OUTPATIENT)
Age: 63
End: 2023-05-20

## 2023-05-20 LAB
ANION GAP SERPL CALC-SCNC: 13 MMOL/L — SIGNIFICANT CHANGE UP (ref 5–17)
BUN SERPL-MCNC: 14 MG/DL — SIGNIFICANT CHANGE UP (ref 7–23)
CALCIUM SERPL-MCNC: 7.7 MG/DL — LOW (ref 8.4–10.5)
CHLORIDE SERPL-SCNC: 101 MMOL/L — SIGNIFICANT CHANGE UP (ref 96–108)
CO2 SERPL-SCNC: 24 MMOL/L — SIGNIFICANT CHANGE UP (ref 22–31)
CREAT SERPL-MCNC: 0.77 MG/DL — SIGNIFICANT CHANGE UP (ref 0.5–1.3)
EGFR: 101 ML/MIN/1.73M2 — SIGNIFICANT CHANGE UP
GLUCOSE SERPL-MCNC: 85 MG/DL — SIGNIFICANT CHANGE UP (ref 70–99)
HCT VFR BLD CALC: 21.7 % — LOW (ref 39–50)
HCT VFR BLD CALC: 32.7 % — LOW (ref 39–50)
HGB BLD-MCNC: 11.4 G/DL — LOW (ref 13–17)
HGB BLD-MCNC: 7.4 G/DL — LOW (ref 13–17)
MCHC RBC-ENTMCNC: 31.2 PG — SIGNIFICANT CHANGE UP (ref 27–34)
MCHC RBC-ENTMCNC: 31.6 PG — SIGNIFICANT CHANGE UP (ref 27–34)
MCHC RBC-ENTMCNC: 34.1 GM/DL — SIGNIFICANT CHANGE UP (ref 32–36)
MCHC RBC-ENTMCNC: 34.9 GM/DL — SIGNIFICANT CHANGE UP (ref 32–36)
MCV RBC AUTO: 89.6 FL — SIGNIFICANT CHANGE UP (ref 80–100)
MCV RBC AUTO: 92.7 FL — SIGNIFICANT CHANGE UP (ref 80–100)
NRBC # BLD: 0 /100 WBCS — SIGNIFICANT CHANGE UP (ref 0–0)
NRBC # BLD: 0 /100 WBCS — SIGNIFICANT CHANGE UP (ref 0–0)
PLATELET # BLD AUTO: 219 K/UL — SIGNIFICANT CHANGE UP (ref 150–400)
PLATELET # BLD AUTO: 257 K/UL — SIGNIFICANT CHANGE UP (ref 150–400)
POTASSIUM SERPL-MCNC: 3.8 MMOL/L — SIGNIFICANT CHANGE UP (ref 3.5–5.3)
POTASSIUM SERPL-SCNC: 3.8 MMOL/L — SIGNIFICANT CHANGE UP (ref 3.5–5.3)
RBC # BLD: 2.34 M/UL — LOW (ref 4.2–5.8)
RBC # BLD: 3.65 M/UL — LOW (ref 4.2–5.8)
RBC # FLD: 11.9 % — SIGNIFICANT CHANGE UP (ref 10.3–14.5)
RBC # FLD: 12.3 % — SIGNIFICANT CHANGE UP (ref 10.3–14.5)
SODIUM SERPL-SCNC: 138 MMOL/L — SIGNIFICANT CHANGE UP (ref 135–145)
WBC # BLD: 6.23 K/UL — SIGNIFICANT CHANGE UP (ref 3.8–10.5)
WBC # BLD: 7.2 K/UL — SIGNIFICANT CHANGE UP (ref 3.8–10.5)
WBC # FLD AUTO: 6.23 K/UL — SIGNIFICANT CHANGE UP (ref 3.8–10.5)
WBC # FLD AUTO: 7.2 K/UL — SIGNIFICANT CHANGE UP (ref 3.8–10.5)

## 2023-05-20 RX ORDER — ACETAMINOPHEN 500 MG
2 TABLET ORAL
Qty: 0 | Refills: 0 | DISCHARGE
Start: 2023-05-20

## 2023-05-20 RX ORDER — SENNA PLUS 8.6 MG/1
2 TABLET ORAL
Qty: 0 | Refills: 0 | DISCHARGE
Start: 2023-05-20

## 2023-05-20 RX ADMIN — Medication 81 MILLIGRAM(S): at 12:11

## 2023-05-20 RX ADMIN — LOSARTAN POTASSIUM 50 MILLIGRAM(S): 100 TABLET, FILM COATED ORAL at 06:03

## 2023-05-20 RX ADMIN — HEPARIN SODIUM 5000 UNIT(S): 5000 INJECTION INTRAVENOUS; SUBCUTANEOUS at 09:19

## 2023-05-20 RX ADMIN — Medication 100 MILLIGRAM(S): at 22:16

## 2023-05-20 RX ADMIN — Medication 100 MILLIGRAM(S): at 00:06

## 2023-05-20 RX ADMIN — TAMSULOSIN HYDROCHLORIDE 0.4 MILLIGRAM(S): 0.4 CAPSULE ORAL at 22:16

## 2023-05-20 RX ADMIN — ATORVASTATIN CALCIUM 10 MILLIGRAM(S): 80 TABLET, FILM COATED ORAL at 22:16

## 2023-05-20 RX ADMIN — Medication 100 MILLIGRAM(S): at 09:18

## 2023-05-20 RX ADMIN — SODIUM CHLORIDE 75 MILLILITER(S): 9 INJECTION, SOLUTION INTRAVENOUS at 20:03

## 2023-05-20 RX ADMIN — HEPARIN SODIUM 5000 UNIT(S): 5000 INJECTION INTRAVENOUS; SUBCUTANEOUS at 17:37

## 2023-05-20 RX ADMIN — HEPARIN SODIUM 5000 UNIT(S): 5000 INJECTION INTRAVENOUS; SUBCUTANEOUS at 00:07

## 2023-05-20 RX ADMIN — AMLODIPINE BESYLATE 10 MILLIGRAM(S): 2.5 TABLET ORAL at 06:03

## 2023-05-20 NOTE — DISCHARGE NOTE PROVIDER - NSDCCPTREATMENT_GEN_ALL_CORE_FT
PRINCIPAL PROCEDURE  Procedure: Nephrostolithotomy, percutaneous, with lithotripsy, large stone  Findings and Treatment: Left side

## 2023-05-20 NOTE — DISCHARGE NOTE PROVIDER - NSDCFUADDINST_GEN_ALL_CORE_FT
PAIN CONTROL: You may take 650 mg of Tylenol every 4-6 hours. Do not exceed more than 4000mg or 4 grams of Tylenol daily. You may take Tylenol and Ibuprofen as needed for pain. If you were prescribed narcotic pain medication, take as directed. You should also take senna to prevent constipation.    BATHING: Please do not submerge wound underwater. You may shower and/or sponge bathe.    ACTIVITY: No heavy lifting or straining. Otherwise, you may return to your usual level of physical activity. If you are taking narcotic pain medications (such as oxycodone), do NOT drive a car, operate machinery or make important decisions.    DIET: Return to your usual diet    URINE: You may have intermittent pink to red tinged urine.  This is normal after a percutaneous nephrolithotomy procedure. It is not uncommon to have some burning when you urinate. If you are unable to urinate or your urine becomes bright red or with clots, please call the office. Please make sure you drink plenty of fluids.    NOTIFY YOUR SURGEON IF: You have any bleeding that does not stop, any fevers (over 100.4F) or chills, persistent nausea/vomiting, persistent diarrhea, your pain is not controlled on your discharge pain medications, heavy bleeding in the urine, inability to urinate, or other worrisome symptoms arise.   FOLLOW UP:  1. Please call your surgeon to make a follow up appointment within two weeks of discharge  2: Please follow up with your primary care physician within 1-2 weeks regarding your hospitalization.

## 2023-05-20 NOTE — DISCHARGE NOTE PROVIDER - HOSPITAL COURSE
This is a 62 year old male who underwent a scheduled left PCNL.  POD#1 his zuleta was removed and he was  voiding dark red urine but without retention.  CT scan showed no residual calculi.  POD#2, his left nephrostomy tube was also draining dark red urine and was left in place.  POD#3 IR was called to perform an angiogram and embolization of the left kidney, and the patient required embolization for bleeding.  POD#4, his nephrostomy tube was left in place.  He was also noted to have a hematoma on his right groin access site that was demarcated. POD#5, This is a 62 year old male who underwent a scheduled left PCNL.  POD#1 his zuleta was removed and he was voiding dark red urine but without retention.  CT scan showed no residual calculi.  POD#2, his left nephrostomy tube was also draining dark red urine and was left in place.  POD#3 IR was called to perform an angiogram and embolization of the left kidney, and the patient required embolization for bleeding.  POD#4, his nephrostomy tube was left in place.  He was also noted to have a hematoma on his right groin access site that was demarcated. POD#5, he required 2 units of PRBCs for anemia as a result of the PCNL. This is a 62 year old male who underwent a scheduled left PCNL.  POD#1 his zuleta was removed and he was voiding dark red urine but without retention.  CT scan showed no residual calculi.  POD#2, his left nephrostomy tube POD#3 IR was called to perform an angiogram and embolization of the left kidney, and the patient required embolization for bleeding.  He was noted to have a hematoma on his right groin access site that was demarcated. POD#5, he required 2 units of PRBCs for anemia. This is a 62 year old male who underwent a scheduled left PCNL.  POD#1 his zuleta was removed and he was voiding dark red urine but without retention.  CT scan showed no residual calculi.  His left nephrostomy tube was kept in for dark urine output.  POD#3 IR was called to perform an angiogram and embolization of the left kidney, and the patient required embolization for bleeding.  He was noted to have a hematoma on his right groin access site that was demarcated. POD#5, he required 2 units of PRBCs for anemia. This is a 62 year old male who underwent a scheduled left PCNL.  POD#1 his zuleta was removed and he was voiding dark red urine but without retention.  CT scan showed no residual calculi.  His left nephrostomy tube was kept in for dark urine output.  POD#3 IR was called to perform an angiogram and embolization of the left kidney, and the patient required embolization for bleeding, the L PCN was replaced by IR.  He was noted to have a hematoma on his right groin access site that was demarcated. POD#5, he required 2 units of PRBCs for anemia. POD#6 went to IR for management of R groin hematoma which required Balloon-assisted thrombin injection of pseudoaneurysm. Heme consulted for recurrent bleed. Blood work ordered. Noted to have slightly low factor XIII, recommended outpatient workup. POD#7-9 pt with continued bloody output from PCN. Also with significant L flank pain. IR reconsulted for repeat management of suspected L renal pseudoaneurysm. POD#10 underwent L renal angiogram w/ several small pseudoaneurysms and coil embolization was performed of 4 distal branches with IR. POD#11, PCN was removed without events. Pt reports pain better. Pt did well post op. At the time of discharge, the patient was hemodynamically stable, was tolerating PO diet, was voiding urine and passing stool, was ambulating, and was comfortable with adequate pain control. Pt/family given discharge plan/instructions and agrees with plan. Was on Ancef throughout hospital course for gu ppx, no further abx needed on dc. Dr. Hoenig cleared pt for discharge.

## 2023-05-20 NOTE — PROGRESS NOTE ADULT - SUBJECTIVE AND OBJECTIVE BOX
IR Follow-up    Patient s/p renal angio with possible R. groin pseudoaneurysm. Bled overnight now receiving transfusion. Seen and examined at bedside.    Exam: R groin soft with good pulses but TTP. Improved from yesterday evening.    Recommendations:    Will f/u pending ultrasound of the R. groin to evaluate for groin site complication.     Elliot Boone MD  Interventional Radiology PGY6    -EMERGENT: [facility] IR Pager: [phone].  [facility] IR Pager: [phone] [id number]  -Nonemergent consults:  [facility] order "Consult- Interventional Radiology"  -Available on [employer] TEAMS for questions

## 2023-05-20 NOTE — DISCHARGE NOTE PROVIDER - NSDCCPCAREPLAN_GEN_ALL_CORE_FT
PRINCIPAL DISCHARGE DIAGNOSIS  Diagnosis: Kidney stone  Assessment and Plan of Treatment: There is a wound in the back that may leak urine until it closes up completely  Cover the wound with a clean gauze and tape until it heals  Take tylenol for pain, senna to prevent constipation, and the antibiotics  Drink plenty of fluids  Follow with Dr Hoenig  within 2 weeks  Call the office at [phone] if you have fevers of 101F+ or heavy bleeding in the urine.        SECONDARY DISCHARGE DIAGNOSES  Diagnosis: HTN (hypertension)  Assessment and Plan of Treatment: Take your blood pressure medications daily and follow up routinely with your primary care doctor.      Diagnosis: HLD (hyperlipidemia)  Assessment and Plan of Treatment: Continue taking your cholesterol medications and follow up routinely with your primary care doctor.      Diagnosis: Gastric cancer  Assessment and Plan of Treatment: Follow up routinely with your cancer physician

## 2023-05-20 NOTE — DISCHARGE NOTE PROVIDER - NSDCMRMEDTOKEN_GEN_ALL_CORE_FT
acetaminophen 325 mg oral tablet: 2 tab(s) orally every 6 hours As needed Mild Pain (1 - 3)  amLODIPine 10 mg oral tablet: 1 tab(s) orally once a day  aspirin 81 mg oral tablet: orally once a day  atorvastatin 10 mg oral tablet: 1 orally  Flomax 0.4 mg oral capsule: 1 orally once a day  losartan-hydrochlorothiazide 50 mg-12.5 mg oral tablet: 1 orally once a day  senna leaf extract oral tablet: 2 tab(s) orally once a day (at bedtime) As needed Constipation   acetaminophen 325 mg oral tablet: 2 tab(s) orally every 6 hours As needed Mild Pain (1 - 3)  amLODIPine 10 mg oral tablet: 1 tab(s) orally once a day  aspirin 81 mg oral tablet: orally once a day  atorvastatin 10 mg oral tablet: 1 tablet orally once a day  Flomax 0.4 mg oral capsule: 1 orally once a day  hydroCHLOROthiazide 12.5 mg oral capsule: 1 cap(s) orally once a day  losartan 50 mg oral tablet: 1 tab(s) orally once a day  losartan-hydrochlorothiazide 50 mg-12.5 mg oral tablet: 1 orally once a day  oxyCODONE 5 mg oral tablet: 1 tab(s) orally every 6 hours x 2 days as needed for Moderate Pain (4 - 6) take one tab every 6 hours as needed for severe pain MDD: 4  polyethylene glycol 3350 oral powder for reconstitution: 17 gram(s) orally once a day  senna leaf extract oral tablet: 2 tab(s) orally once a day (at bedtime) As needed Constipation

## 2023-05-20 NOTE — PROGRESS NOTE ADULT - ASSESSMENT
62y Male s/pL PCNL, post op hematuria, s/p IR L angiogram and embolization, replacement of NT     - AM labs reviewed  - Continue NT  - 2u pRBC  - F/u IR regarding hematoma  - DVT prophylaxis/OOB  - Incentive spirometry  - Monitor urine output and color  - Analgesia and antiemetics as needed  - Regular Diet  - Ancef

## 2023-05-20 NOTE — PROGRESS NOTE ADULT - SUBJECTIVE AND OBJECTIVE BOX
Subjective  R groin hematoma on exam noticed yesterday evening. Patient reports pain at R groin this morning. No other complaints at this time. Denies nausea or vomiting.    Objective    Vital signs  T(F): , Max: 98.6 (05-20-23 @ 00:48)  HR: 81 (05-20-23 @ 09:51)  BP: 105/60 (05-20-23 @ 09:51)  SpO2: 99% (05-20-23 @ 09:51)  Wt(kg): --    Output     OUT:    Nephrostomy Tube (mL): 825 mL    Voided (mL): 1370 mL  Total OUT: 2195 mL    Total NET: -2195 mL      OUT:    Nephrostomy Tube (mL): 325 mL    Voided (mL): 200 mL  Total OUT: 525 mL    Total NET: -525 mL        Gen: NAD  Pulm: No respiratory distress, no subcostal retractions  CV: RRR, no JVD  Abd: Soft, NT, ND, L NT in place with pouch draining clear red urine  : voided urine is clear  Extremities:  R groin site dressing C/D/I, hematoma ~5x5, soft and nonpulsatile, mild tenderness to palpation  R femoral pulse 2+, No pedal edema or calf tenderness noted.    Labs      05-20 @ 06:45    WBC 6.23  / Hct 21.7  / SCr --       05-20 @ 06:44    WBC --    / Hct --    / SCr 0.77         Culture - Urine (collected 05-15-23 @ 14:36)  Source: Kidney Kidney  Final Report (05-17-23 @ 16:24):    No growth    Culture - Urine (collected 05-15-23 @ 14:36)  Source: Kidney Kidney  Final Report (05-17-23 @ 16:26):    No growth    Culture - Other (collected 05-15-23 @ 14:36)  Source: .Other Other  Final Report (05-17-23 @ 08:17):    No growth at 48 hours

## 2023-05-20 NOTE — DISCHARGE NOTE PROVIDER - CARE PROVIDERS DIRECT ADDRESSES
,[email or web address] ,DirectAddress_Unknown,DirectAddress_Unknown

## 2023-05-20 NOTE — DISCHARGE NOTE PROVIDER - CARE PROVIDER_API CALL
Hoenig, David M (MD)  Urology  [facility]- Dept. of Urology, [address] [place] [address]  Phone: [phone]  Fax: [phone]  Follow Up Time: 2 weeks   Hoenig, David Mayer  Urology  [address] [place] [address]  Phone: [phone]  Fax: [phone]  Follow Up Time: 2 weeks    Fifi Shook  Hematology/Oncology  [address] [place] [address]  Phone: [phone]  Fax: [phone]  Follow Up Time: 2 weeks

## 2023-05-20 NOTE — DISCHARGE NOTE PROVIDER - PROVIDER TOKENS
PROVIDER:[TOKEN:[[id number]],FOLLOWUP:[2 weeks]] PROVIDER:[TOKEN:[[id number]],FOLLOWUP:[2 weeks]],PROVIDER:[TOKEN:[[id number]],FOLLOWUP:[2 weeks]]

## 2023-05-20 NOTE — DISCHARGE NOTE PROVIDER - NSDCFUSCHEDAPPT_GEN_ALL_CORE_FT
Hoenig, David  [facility] Physician [facility]  UROLOGY [address] [facility]  Scheduled Appointment: 05/31/2023

## 2023-05-21 LAB
ANION GAP SERPL CALC-SCNC: 11 MMOL/L — SIGNIFICANT CHANGE UP (ref 5–17)
BUN SERPL-MCNC: 14 MG/DL — SIGNIFICANT CHANGE UP (ref 7–23)
CALCIUM SERPL-MCNC: 8.4 MG/DL — SIGNIFICANT CHANGE UP (ref 8.4–10.5)
CHLORIDE SERPL-SCNC: 103 MMOL/L — SIGNIFICANT CHANGE UP (ref 96–108)
CO2 SERPL-SCNC: 24 MMOL/L — SIGNIFICANT CHANGE UP (ref 22–31)
CREAT SERPL-MCNC: 0.76 MG/DL — SIGNIFICANT CHANGE UP (ref 0.5–1.3)
EGFR: 101 ML/MIN/1.73M2 — SIGNIFICANT CHANGE UP
GLUCOSE SERPL-MCNC: 90 MG/DL — SIGNIFICANT CHANGE UP (ref 70–99)
HCT VFR BLD CALC: 34.1 % — LOW (ref 39–50)
HGB BLD-MCNC: 11.5 G/DL — LOW (ref 13–17)
MCHC RBC-ENTMCNC: 31.1 PG — SIGNIFICANT CHANGE UP (ref 27–34)
MCHC RBC-ENTMCNC: 33.7 GM/DL — SIGNIFICANT CHANGE UP (ref 32–36)
MCV RBC AUTO: 92.2 FL — SIGNIFICANT CHANGE UP (ref 80–100)
NRBC # BLD: 0 /100 WBCS — SIGNIFICANT CHANGE UP (ref 0–0)
PLATELET # BLD AUTO: 277 K/UL — SIGNIFICANT CHANGE UP (ref 150–400)
POTASSIUM SERPL-MCNC: 3.9 MMOL/L — SIGNIFICANT CHANGE UP (ref 3.5–5.3)
POTASSIUM SERPL-SCNC: 3.9 MMOL/L — SIGNIFICANT CHANGE UP (ref 3.5–5.3)
RBC # BLD: 3.7 M/UL — LOW (ref 4.2–5.8)
RBC # FLD: 12.6 % — SIGNIFICANT CHANGE UP (ref 10.3–14.5)
SODIUM SERPL-SCNC: 138 MMOL/L — SIGNIFICANT CHANGE UP (ref 135–145)
WBC # BLD: 6.87 K/UL — SIGNIFICANT CHANGE UP (ref 3.8–10.5)
WBC # FLD AUTO: 6.87 K/UL — SIGNIFICANT CHANGE UP (ref 3.8–10.5)

## 2023-05-21 PROCEDURE — 93926 LOWER EXTREMITY STUDY: CPT | Mod: 26,RT

## 2023-05-21 RX ORDER — SODIUM CHLORIDE 9 MG/ML
1000 INJECTION, SOLUTION INTRAVENOUS
Refills: 0 | Status: DISCONTINUED | OUTPATIENT
Start: 2023-05-21 | End: 2023-05-24

## 2023-05-21 RX ADMIN — Medication 100 MILLIGRAM(S): at 14:01

## 2023-05-21 RX ADMIN — Medication 100 MILLIGRAM(S): at 21:30

## 2023-05-21 RX ADMIN — Medication 81 MILLIGRAM(S): at 11:34

## 2023-05-21 RX ADMIN — TAMSULOSIN HYDROCHLORIDE 0.4 MILLIGRAM(S): 0.4 CAPSULE ORAL at 21:29

## 2023-05-21 RX ADMIN — HEPARIN SODIUM 5000 UNIT(S): 5000 INJECTION INTRAVENOUS; SUBCUTANEOUS at 08:26

## 2023-05-21 RX ADMIN — Medication 100 MILLIGRAM(S): at 05:26

## 2023-05-21 RX ADMIN — ATORVASTATIN CALCIUM 10 MILLIGRAM(S): 80 TABLET, FILM COATED ORAL at 21:30

## 2023-05-21 RX ADMIN — HEPARIN SODIUM 5000 UNIT(S): 5000 INJECTION INTRAVENOUS; SUBCUTANEOUS at 01:53

## 2023-05-21 NOTE — PROGRESS NOTE ADULT - SUBJECTIVE AND OBJECTIVE BOX
Subjective  s/p 2 units prbc yesterday   without complaints   Objective    Vital signs  T(F): , Max: 98.5 (05-20-23 @ 13:10)  HR: 74 (05-21-23 @ 05:28)  BP: 101/67 (05-21-23 @ 05:28)  SpO2: 95% (05-21-23 @ 04:25)  Wt(kg): --    Output     05-20 @ 07:01  -  05-21 @ 07:00  --------------------------------------------------------  IN: 1840 mL / OUT: 2150 mL / NET: -310 mL    05-21 @ 07:01  -  05-21 @ 09:56  --------------------------------------------------------  IN: 0 mL / OUT: 600 mL / NET: -600 mL        Gen awake alert nad axox3  Abd flat soft ntnd   Back tube in place no hematoma/ ecchymosis  urine thin orange red no clots  Groin right with improving palp hematoma.   + rle pulses     Labs      05-21 @ 07:19    WBC 6.87  / Hct 34.1  / SCr 0.76     05-20 @ 22:22    WBC 7.20  / Hct 32.7  / SCr --       Imaging  pelvic sono pending today

## 2023-05-21 NOTE — PROGRESS NOTE ADULT - SUBJECTIVE AND OBJECTIVE BOX
IR Follow-up. Chart and imaging reviewed.    Assessment: 62M s/p post-pcnl bleed angio and embo on 5/18. Reported swelling in the R. groin access site. U/S demonstrated 2cm pseudoaneurysm.    Plan:  - Please place patient on bedrest.   - Will plan for treatment of pseudoaneurysm tomorrow provided no contraindication arise.  - Please place order for IR Procedure, approving attending Dr. Bailey  - NPO past midnight  - hold therapeutic and prophylactic anticoagulants  - maintain active type and screen x 2  - Please draw AM labs - CBC/INR/BMP  - If the patients clinical status should change do not hesitate to reach out for more urgent evaluation.

## 2023-05-21 NOTE — PROGRESS NOTE ADULT - ASSESSMENT
62y Male s/pL PCNL, post op hematuria, s/p IR L angiogram and embolization, replacement of NT; right groin hematoma r/o pseudoaneurysm     - AM labs reviewed  - Continue NT  - 2u pRBC yesterday   - for sono today  - DVT prophylaxis/OOB  - Incentive spirometry  - Monitor urine output and color  - Analgesia and antiemetics as needed  - Regular Diet  - Ancef

## 2023-05-22 LAB
ANION GAP SERPL CALC-SCNC: 12 MMOL/L — SIGNIFICANT CHANGE UP (ref 5–17)
APTT BLD: 26.6 SEC — LOW (ref 27.5–35.5)
BUN SERPL-MCNC: 18 MG/DL — SIGNIFICANT CHANGE UP (ref 7–23)
CALCIUM SERPL-MCNC: 8.7 MG/DL — SIGNIFICANT CHANGE UP (ref 8.4–10.5)
CHLORIDE SERPL-SCNC: 99 MMOL/L — SIGNIFICANT CHANGE UP (ref 96–108)
CO2 SERPL-SCNC: 26 MMOL/L — SIGNIFICANT CHANGE UP (ref 22–31)
CREAT SERPL-MCNC: 0.82 MG/DL — SIGNIFICANT CHANGE UP (ref 0.5–1.3)
EGFR: 99 ML/MIN/1.73M2 — SIGNIFICANT CHANGE UP
GLUCOSE SERPL-MCNC: 99 MG/DL — SIGNIFICANT CHANGE UP (ref 70–99)
HCT VFR BLD CALC: 35.2 % — LOW (ref 39–50)
HGB BLD-MCNC: 11.7 G/DL — LOW (ref 13–17)
INR BLD: 1.01 RATIO — SIGNIFICANT CHANGE UP (ref 0.88–1.16)
MCHC RBC-ENTMCNC: 30.4 PG — SIGNIFICANT CHANGE UP (ref 27–34)
MCHC RBC-ENTMCNC: 33.2 GM/DL — SIGNIFICANT CHANGE UP (ref 32–36)
MCV RBC AUTO: 91.4 FL — SIGNIFICANT CHANGE UP (ref 80–100)
NRBC # BLD: 0 /100 WBCS — SIGNIFICANT CHANGE UP (ref 0–0)
PLATELET # BLD AUTO: 314 K/UL — SIGNIFICANT CHANGE UP (ref 150–400)
POTASSIUM SERPL-MCNC: 4.2 MMOL/L — SIGNIFICANT CHANGE UP (ref 3.5–5.3)
POTASSIUM SERPL-SCNC: 4.2 MMOL/L — SIGNIFICANT CHANGE UP (ref 3.5–5.3)
PROTHROM AB SERPL-ACNC: 11.7 SEC — SIGNIFICANT CHANGE UP (ref 10.5–13.4)
RBC # BLD: 3.85 M/UL — LOW (ref 4.2–5.8)
RBC # FLD: 12.2 % — SIGNIFICANT CHANGE UP (ref 10.3–14.5)
SODIUM SERPL-SCNC: 137 MMOL/L — SIGNIFICANT CHANGE UP (ref 135–145)
WBC # BLD: 8.02 K/UL — SIGNIFICANT CHANGE UP (ref 3.8–10.5)
WBC # FLD AUTO: 8.02 K/UL — SIGNIFICANT CHANGE UP (ref 3.8–10.5)

## 2023-05-22 PROCEDURE — 76942 ECHO GUIDE FOR BIOPSY: CPT | Mod: 26

## 2023-05-22 PROCEDURE — 99223 1ST HOSP IP/OBS HIGH 75: CPT

## 2023-05-22 PROCEDURE — 36002 PSEUDOANEURYSM INJECTION TRT: CPT | Mod: RT

## 2023-05-22 RX ORDER — HEPARIN SODIUM 5000 [USP'U]/ML
5000 INJECTION INTRAVENOUS; SUBCUTANEOUS EVERY 8 HOURS
Refills: 0 | Status: DISCONTINUED | OUTPATIENT
Start: 2023-05-22 | End: 2023-05-24

## 2023-05-22 RX ORDER — ASPIRIN/CALCIUM CARB/MAGNESIUM 324 MG
81 TABLET ORAL DAILY
Refills: 0 | Status: DISCONTINUED | OUTPATIENT
Start: 2023-05-22 | End: 2023-05-24

## 2023-05-22 RX ADMIN — AMLODIPINE BESYLATE 10 MILLIGRAM(S): 2.5 TABLET ORAL at 05:10

## 2023-05-22 RX ADMIN — Medication 100 MILLIGRAM(S): at 05:09

## 2023-05-22 RX ADMIN — SODIUM CHLORIDE 75 MILLILITER(S): 9 INJECTION, SOLUTION INTRAVENOUS at 13:52

## 2023-05-22 RX ADMIN — LOSARTAN POTASSIUM 50 MILLIGRAM(S): 100 TABLET, FILM COATED ORAL at 05:10

## 2023-05-22 RX ADMIN — Medication 100 MILLIGRAM(S): at 13:51

## 2023-05-22 RX ADMIN — Medication 100 MILLIGRAM(S): at 21:19

## 2023-05-22 RX ADMIN — HEPARIN SODIUM 5000 UNIT(S): 5000 INJECTION INTRAVENOUS; SUBCUTANEOUS at 21:27

## 2023-05-22 RX ADMIN — TAMSULOSIN HYDROCHLORIDE 0.4 MILLIGRAM(S): 0.4 CAPSULE ORAL at 21:19

## 2023-05-22 RX ADMIN — ATORVASTATIN CALCIUM 10 MILLIGRAM(S): 80 TABLET, FILM COATED ORAL at 21:18

## 2023-05-22 NOTE — PROGRESS NOTE ADULT - SUBJECTIVE AND OBJECTIVE BOX
UROLOGY [place] PROGRESS NOTE:     Subjective:  no acute events overnight    Objective:  Vital signs  T(F): , Max: 99 (05-21-23 @ 21:41)  HR: 78 (05-22-23 @ 06:56)  BP: 162/84 (05-22-23 @ 06:56)  SpO2: 96% (05-22-23 @ 06:56)  Wt(kg): --    Output     05-21 @ 07:01  -  05-22 @ 07:00  --------------------------------------------------------  IN: 1930 mL / OUT: 2500 mL / NET: -570 mL    void 600cc  L NT-     Physical Exam:  Gen: NAD  Abd: soft, NT/ND, left flank dressing CDI, NT output translucent punch   : voiding     Labs:  05-22  8.02  / 35.2  /x      05-22  x     / x     /0.82                           11.7   8.02  )-----------( 314      ( 22 May 2023 06:35 )             35.2     05-22    137  |  99  |  18  ----------------------------<  99  4.2   |  26  |  0.82    Ca    8.7      22 May 2023 06:33      PT/INR - ( 22 May 2023 06:37 )   PT: 11.7 sec;   INR: 1.01 ratio         PTT - ( 22 May 2023 06:37 )  PTT:26.6 sec      Urine Cx:

## 2023-05-22 NOTE — CONSULT NOTE ADULT - SUBJECTIVE AND OBJECTIVE BOX
Hematology Consult Note  ***recs not final until attending attestation***    Kulwant Sandoval MD PGY-4  Reachable on TEAMS    HPI:  62y Male s/p L. PCNL, post op hematuria, s/p IR L angiogram and embolization, replacement of NT; c/b right groin access pseudoaneurysm.   Patient underwent left percutaneous stone removal last week (5/15). He developed a bleed postop needing and IR angiogram for embolization of small bleeding vessels. Now he has developed a pseudoaneurysm at his right groin access site and s/p second IR procedure. Given his recurrent episodes of bleeding events following percutaneous procedures heme consulted. His CBC/coags have all been normal.      Allergies    No Known Allergies    Intolerances        MEDICATIONS  (STANDING):  amLODIPine   Tablet 10 milliGRAM(s) Oral daily  atorvastatin 10 milliGRAM(s) Oral at bedtime  ceFAZolin   IVPB 2000 milliGRAM(s) IV Intermittent every 8 hours  hydrochlorothiazide 12.5 milliGRAM(s) Oral daily  lactated ringers. 1000 milliLiter(s) (75 mL/Hr) IV Continuous <Continuous>  losartan 50 milliGRAM(s) Oral daily  tamsulosin 0.4 milliGRAM(s) Oral at bedtime    MEDICATIONS  (PRN):  acetaminophen     Tablet .. 650 milliGRAM(s) Oral every 6 hours PRN Mild Pain (1 - 3)  calcium carbonate    500 mG (Tums) Chewable 1 Tablet(s) Chew every 6 hours PRN Heartburn  ondansetron Injectable 4 milliGRAM(s) IV Push every 6 hours PRN Nausea and/or Vomiting  oxyCODONE    IR 10 milliGRAM(s) Oral every 6 hours PRN Severe Pain (7 - 10)  oxyCODONE    IR 5 milliGRAM(s) Oral every 6 hours PRN Moderate Pain (4 - 6)  phenazopyridine 200 milliGRAM(s) Oral every 8 hours PRN dysuria  senna 2 Tablet(s) Oral at bedtime PRN Constipation      PAST MEDICAL & SURGICAL HISTORY:  Chronic gastric ulcer, unspecified whether gastric ulcer hemorrhage or perforation present      Hyperlipidemia, unspecified hyperlipidemia type      Gastric lesion  with high grade dysplasia      Essential hypertension      2019 novel coronavirus disease (COVID-19)      Borderline diabetes      History of BPH      Pain in back      Gastric cancer      S/P appendectomy      History of gastrectomy      H/O endoscopy      H/O colonoscopy          FAMILY HISTORY:      SOCIAL HISTORY: No EtOH, no tobacco    REVIEW OF SYSTEMS:    CONSTITUTIONAL: No weakness, fevers or chills  EYES/ENT: No visual changes;  No vertigo or throat pain   NECK: No pain or stiffness  RESPIRATORY: No cough, wheezing, hemoptysis; No shortness of breath  CARDIOVASCULAR: No chest pain or palpitations  GASTROINTESTINAL: No abdominal or epigastric pain. No nausea, vomiting, or hematemesis; No diarrhea or constipation. No melena or hematochezia.  GENITOURINARY: No dysuria, frequency or hematuria  NEUROLOGICAL: No numbness or weakness  SKIN: No itching, burning, rashes, or lesions   All other review of systems is negative unless indicated above.        T(F): 98.2 (05-22-23 @ 06:56), Max: 99 (05-21-23 @ 21:41)  HR: 78 (05-22-23 @ 06:56)  BP: 162/84 (05-22-23 @ 06:56)  RR: 18 (05-22-23 @ 06:56)  SpO2: 96% (05-22-23 @ 06:56)  Wt(kg): --    Constitutional: NAD  Eyes: EOMI, sclera non-icteric  Neck: supple  Respiratory: no inc wob  Cardiovascular: RRR,   Gastrointestinal: soft, NTND, no masses palpable,  Extremities: no cyanosis, no clubbing                          11.7   8.02  )-----------( 314      ( 22 May 2023 06:35 )             35.2       05-22    137  |  99  |  18  ----------------------------<  99  4.2   |  26  |  0.82    Ca    8.7      22 May 2023 06:33            RADIOLOGY & ADDITIONAL TESTS:  Studies reviewed.     Hematology Consult Note  ***recs not final until attending attestation***    Kulwant Sandoval MD PGY-4  Reachable on TEAMS    HPI:  62y Male s/p L. PCNL, post op hematuria, s/p IR L angiogram and embolization, replacement of NT; c/b right groin access pseudoaneurysm.   Patient underwent left percutaneous stone removal last week (5/15). He developed a bleed postop needing and IR angiogram for embolization of small bleeding vessels. Now he has developed a pseudoaneurysm at his right groin access site and s/p second IR procedure. Given his recurrent episodes of bleeding events following percutaneous procedures heme consulted. His CBC/coags have all been normal.      Saw patient w/ Turkish  ID [id number].       Allergies    No Known Allergies    Intolerances        MEDICATIONS  (STANDING):  amLODIPine   Tablet 10 milliGRAM(s) Oral daily  atorvastatin 10 milliGRAM(s) Oral at bedtime  ceFAZolin   IVPB 2000 milliGRAM(s) IV Intermittent every 8 hours  hydrochlorothiazide 12.5 milliGRAM(s) Oral daily  lactated ringers. 1000 milliLiter(s) (75 mL/Hr) IV Continuous <Continuous>  losartan 50 milliGRAM(s) Oral daily  tamsulosin 0.4 milliGRAM(s) Oral at bedtime    MEDICATIONS  (PRN):  acetaminophen     Tablet .. 650 milliGRAM(s) Oral every 6 hours PRN Mild Pain (1 - 3)  calcium carbonate    500 mG (Tums) Chewable 1 Tablet(s) Chew every 6 hours PRN Heartburn  ondansetron Injectable 4 milliGRAM(s) IV Push every 6 hours PRN Nausea and/or Vomiting  oxyCODONE    IR 10 milliGRAM(s) Oral every 6 hours PRN Severe Pain (7 - 10)  oxyCODONE    IR 5 milliGRAM(s) Oral every 6 hours PRN Moderate Pain (4 - 6)  phenazopyridine 200 milliGRAM(s) Oral every 8 hours PRN dysuria  senna 2 Tablet(s) Oral at bedtime PRN Constipation      PAST MEDICAL & SURGICAL HISTORY:  Chronic gastric ulcer, unspecified whether gastric ulcer hemorrhage or perforation present      Hyperlipidemia, unspecified hyperlipidemia type      Gastric lesion  with high grade dysplasia      Essential hypertension      2019 novel coronavirus disease (COVID-19)      Borderline diabetes      History of BPH      Pain in back      Gastric cancer      S/P appendectomy      History of gastrectomy      H/O endoscopy      H/O colonoscopy          FAMILY HISTORY:      SOCIAL HISTORY: No EtOH, no tobacco    REVIEW OF SYSTEMS:    CONSTITUTIONAL: No weakness, fevers or chills  EYES/ENT: No visual changes;  No vertigo or throat pain   NECK: No pain or stiffness  RESPIRATORY: No cough, wheezing, hemoptysis; No shortness of breath  CARDIOVASCULAR: No chest pain or palpitations  GASTROINTESTINAL: No abdominal or epigastric pain. No nausea, vomiting, or hematemesis; No diarrhea or constipation. No melena or hematochezia.  GENITOURINARY: No dysuria, frequency or hematuria  NEUROLOGICAL: No numbness or weakness  SKIN: No itching, burning, rashes, or lesions   All other review of systems is negative unless indicated above.        T(F): 98.2 (05-22-23 @ 06:56), Max: 99 (05-21-23 @ 21:41)  HR: 78 (05-22-23 @ 06:56)  BP: 162/84 (05-22-23 @ 06:56)  RR: 18 (05-22-23 @ 06:56)  SpO2: 96% (05-22-23 @ 06:56)  Wt(kg): --    Constitutional: NAD  Eyes: EOMI, sclera non-icteric  Neck: supple  Respiratory: no inc wob  Cardiovascular: RRR,   Back: drainage catheter appreciated w/ drainage of blood  Gastrointestinal: soft, NTND, no masses palpable,  Extremities: no cyanosis, no clubbing                          11.7   8.02  )-----------( 314      ( 22 May 2023 06:35 )             35.2       05-22    137  |  99  |  18  ----------------------------<  99  4.2   |  26  |  0.82    Ca    8.7      22 May 2023 06:33            RADIOLOGY & ADDITIONAL TESTS:  Studies reviewed.     Hematology Consult Note  ***recs not final until attending attestation***    Kulwant Sandoval MD PGY-4  Reachable on TEAMS    HPI:  62y Male s/p L. PCNL, post op hematuria, s/p IR L angiogram and embolization, replacement of NT; c/b right groin access pseudoaneurysm.   Patient underwent left percutaneous stone removal last week (5/15). He developed a bleed postop needing and IR angiogram for embolization of small bleeding vessels. Now he has developed a pseudoaneurysm at his right groin access site and s/p second IR procedure. Given his recurrent episodes of bleeding events following percutaneous procedures heme consulted. His CBC/coags have all been normal.      Could not see patient on day as was going for an IR procedure, saw patient w/ [place]  ID [id number] 5/23, has some gas pains and back pain which has improved, otherwise denies active sxs    Allergies    No Known Allergies    Intolerances        MEDICATIONS  (STANDING):  amLODIPine   Tablet 10 milliGRAM(s) Oral daily  atorvastatin 10 milliGRAM(s) Oral at bedtime  ceFAZolin   IVPB 2000 milliGRAM(s) IV Intermittent every 8 hours  hydrochlorothiazide 12.5 milliGRAM(s) Oral daily  lactated ringers. 1000 milliLiter(s) (75 mL/Hr) IV Continuous <Continuous>  losartan 50 milliGRAM(s) Oral daily  tamsulosin 0.4 milliGRAM(s) Oral at bedtime    MEDICATIONS  (PRN):  acetaminophen     Tablet .. 650 milliGRAM(s) Oral every 6 hours PRN Mild Pain (1 - 3)  calcium carbonate    500 mG (Tums) Chewable 1 Tablet(s) Chew every 6 hours PRN Heartburn  ondansetron Injectable 4 milliGRAM(s) IV Push every 6 hours PRN Nausea and/or Vomiting  oxyCODONE    IR 10 milliGRAM(s) Oral every 6 hours PRN Severe Pain (7 - 10)  oxyCODONE    IR 5 milliGRAM(s) Oral every 6 hours PRN Moderate Pain (4 - 6)  phenazopyridine 200 milliGRAM(s) Oral every 8 hours PRN dysuria  senna 2 Tablet(s) Oral at bedtime PRN Constipation      PAST MEDICAL & SURGICAL HISTORY:  Chronic gastric ulcer, unspecified whether gastric ulcer hemorrhage or perforation present      Hyperlipidemia, unspecified hyperlipidemia type      Gastric lesion  with high grade dysplasia      Essential hypertension      2019 novel coronavirus disease (COVID-19)      Borderline diabetes      History of BPH      Pain in back      Gastric cancer      S/P appendectomy      History of gastrectomy      H/O endoscopy      H/O colonoscopy          FAMILY HISTORY:      SOCIAL HISTORY: No EtOH, no tobacco    REVIEW OF SYSTEMS:    CONSTITUTIONAL: No weakness, fevers or chills  EYES/ENT: No visual changes;  No vertigo or throat pain   NECK: No pain or stiffness  RESPIRATORY: No cough, wheezing, hemoptysis; No shortness of breath  CARDIOVASCULAR: No chest pain or palpitations  GASTROINTESTINAL: +gas pain, No nausea, vomiting, or hematemesis; No diarrhea or constipation. No melena or hematochezia.  GENITOURINARY: No dysuria, frequency or hematuria  NEUROLOGICAL: No numbness or weakness  SKIN: No itching, burning, rashes, or lesions   All other review of systems is negative unless indicated above.        T(F): 98.2 (05-22-23 @ 06:56), Max: 99 (05-21-23 @ 21:41)  HR: 78 (05-22-23 @ 06:56)  BP: 162/84 (05-22-23 @ 06:56)  RR: 18 (05-22-23 @ 06:56)  SpO2: 96% (05-22-23 @ 06:56)  Wt(kg): --    Constitutional: NAD  Eyes: EOMI, sclera non-icteric  Neck: supple  Respiratory: no inc wob  Cardiovascular: RRR,   Back: drainage catheter appreciated w/ drainage of blood  Gastrointestinal: soft, NTND, no masses palpable,  Extremities: no cyanosis, no clubbing                          11.7   8.02  )-----------( 314      ( 22 May 2023 06:35 )             35.2       05-22    137  |  99  |  18  ----------------------------<  99  4.2   |  26  |  0.82    Ca    8.7      22 May 2023 06:33            RADIOLOGY & ADDITIONAL TESTS:  Studies reviewed.

## 2023-05-22 NOTE — PROGRESS NOTE ADULT - ASSESSMENT
62y Male s/pL PCNL, post op hematuria, s/p IR L angiogram and embolization, replacement of NT; right groin access pseudoaneurysm     - AM labs reviewed, H/H stable  - Continue NT  - NPO for IR today to assess right groin   - cont ancef  - f/u heme

## 2023-05-22 NOTE — CONSULT NOTE ADULT - ATTENDING COMMENTS
62M who had recent left percutaneous stone removal (5/15/23), complicated by post-op hematuria and right groin access pseudoaneurysm. Heme consulted for recurrent bleeding in setting of normal coagulation studies.     # Recurrent bleeding: Per Urology, he had more post-op bleeding than expected. CBC shows mild anemia with normal platelet count. PT, PTT, and INR normal.   - Recommend sending the following additional testing to evaluate for other bleeding disorders:  --- Fibrinogen  --- Factor XIII level  --- Von Willibrand panel  - If the above studies are normal, can consider platelet function analysis tests on outpatient follow up  - Trend CBC with differential daily. Continue supportive transfusions as needed to maintain Hgb > 7 and plt > 10.

## 2023-05-22 NOTE — CONSULT NOTE ADULT - ASSESSMENT
62y Male s/p L. PCNL, post op hematuria, s/p IR L angiogram and embolization, replacement of NT; c/b right groin access pseudoaneurysm. Heme consulted for recurrent bleeding in setting of normal coags    #recurrent bleeding in setting of normal coags  - PT/INR/PTT normal  - send von willebrand panel, and factor XIII level and activity  - peripheral smear  - will need platelet function analysis tests outpatient  - outpatient follow up once stable for discharge, please include hematology oncology f/u at [facility] in discharge note   62y Male s/p L. PCNL, post op hematuria, s/p IR L angiogram and embolization, replacement of NT; c/b right groin access pseudoaneurysm. Heme consulted for recurrent bleeding in setting of normal coags    #recurrent bleeding in setting of normal coags  - PT/INR/PTT normal  - send von willebrand panel, and factor XIII level and activity  - send fibrinogen, d-dimer  - will need platelet function analysis tests outpatient  - outpatient follow up once stable for discharge, please include hematology oncology f/u at [facility] in discharge note

## 2023-05-23 LAB
ANION GAP SERPL CALC-SCNC: 15 MMOL/L — SIGNIFICANT CHANGE UP (ref 5–17)
BASOPHILS # BLD AUTO: 0.03 K/UL — SIGNIFICANT CHANGE UP (ref 0–0.2)
BASOPHILS NFR BLD AUTO: 0.4 % — SIGNIFICANT CHANGE UP (ref 0–2)
BUN SERPL-MCNC: 21 MG/DL — SIGNIFICANT CHANGE UP (ref 7–23)
CALCIUM SERPL-MCNC: 8.5 MG/DL — SIGNIFICANT CHANGE UP (ref 8.4–10.5)
CHLORIDE SERPL-SCNC: 97 MMOL/L — SIGNIFICANT CHANGE UP (ref 96–108)
CO2 SERPL-SCNC: 23 MMOL/L — SIGNIFICANT CHANGE UP (ref 22–31)
CREAT SERPL-MCNC: 0.83 MG/DL — SIGNIFICANT CHANGE UP (ref 0.5–1.3)
EGFR: 98 ML/MIN/1.73M2 — SIGNIFICANT CHANGE UP
EOSINOPHIL # BLD AUTO: 0.1 K/UL — SIGNIFICANT CHANGE UP (ref 0–0.5)
EOSINOPHIL NFR BLD AUTO: 1.2 % — SIGNIFICANT CHANGE UP (ref 0–6)
GLUCOSE SERPL-MCNC: 86 MG/DL — SIGNIFICANT CHANGE UP (ref 70–99)
HCT VFR BLD CALC: 33.7 % — LOW (ref 39–50)
HGB BLD-MCNC: 11.6 G/DL — LOW (ref 13–17)
IMM GRANULOCYTES NFR BLD AUTO: 0.7 % — SIGNIFICANT CHANGE UP (ref 0–0.9)
LYMPHOCYTES # BLD AUTO: 1.28 K/UL — SIGNIFICANT CHANGE UP (ref 1–3.3)
LYMPHOCYTES # BLD AUTO: 15 % — SIGNIFICANT CHANGE UP (ref 13–44)
MCHC RBC-ENTMCNC: 31.3 PG — SIGNIFICANT CHANGE UP (ref 27–34)
MCHC RBC-ENTMCNC: 34.4 GM/DL — SIGNIFICANT CHANGE UP (ref 32–36)
MCV RBC AUTO: 90.8 FL — SIGNIFICANT CHANGE UP (ref 80–100)
MONOCYTES # BLD AUTO: 0.65 K/UL — SIGNIFICANT CHANGE UP (ref 0–0.9)
MONOCYTES NFR BLD AUTO: 7.6 % — SIGNIFICANT CHANGE UP (ref 2–14)
NEUTROPHILS # BLD AUTO: 6.42 K/UL — SIGNIFICANT CHANGE UP (ref 1.8–7.4)
NEUTROPHILS NFR BLD AUTO: 75.1 % — SIGNIFICANT CHANGE UP (ref 43–77)
NRBC # BLD: 0 /100 WBCS — SIGNIFICANT CHANGE UP (ref 0–0)
PLATELET # BLD AUTO: 342 K/UL — SIGNIFICANT CHANGE UP (ref 150–400)
POTASSIUM SERPL-MCNC: 3.8 MMOL/L — SIGNIFICANT CHANGE UP (ref 3.5–5.3)
POTASSIUM SERPL-SCNC: 3.8 MMOL/L — SIGNIFICANT CHANGE UP (ref 3.5–5.3)
RBC # BLD: 3.71 M/UL — LOW (ref 4.2–5.8)
RBC # FLD: 11.9 % — SIGNIFICANT CHANGE UP (ref 10.3–14.5)
SODIUM SERPL-SCNC: 135 MMOL/L — SIGNIFICANT CHANGE UP (ref 135–145)
WBC # BLD: 8.54 K/UL — SIGNIFICANT CHANGE UP (ref 3.8–10.5)
WBC # FLD AUTO: 8.54 K/UL — SIGNIFICANT CHANGE UP (ref 3.8–10.5)

## 2023-05-23 PROCEDURE — 99232 SBSQ HOSP IP/OBS MODERATE 35: CPT

## 2023-05-23 RX ORDER — OXYCODONE HYDROCHLORIDE 5 MG/1
5 TABLET ORAL EVERY 6 HOURS
Refills: 0 | Status: DISCONTINUED | OUTPATIENT
Start: 2023-05-23 | End: 2023-05-27

## 2023-05-23 RX ADMIN — Medication 81 MILLIGRAM(S): at 13:53

## 2023-05-23 RX ADMIN — Medication 200 MILLIGRAM(S): at 21:04

## 2023-05-23 RX ADMIN — HEPARIN SODIUM 5000 UNIT(S): 5000 INJECTION INTRAVENOUS; SUBCUTANEOUS at 05:45

## 2023-05-23 RX ADMIN — Medication 650 MILLIGRAM(S): at 16:44

## 2023-05-23 RX ADMIN — SODIUM CHLORIDE 75 MILLILITER(S): 9 INJECTION, SOLUTION INTRAVENOUS at 20:45

## 2023-05-23 RX ADMIN — ATORVASTATIN CALCIUM 10 MILLIGRAM(S): 80 TABLET, FILM COATED ORAL at 21:02

## 2023-05-23 RX ADMIN — AMLODIPINE BESYLATE 10 MILLIGRAM(S): 2.5 TABLET ORAL at 05:46

## 2023-05-23 RX ADMIN — Medication 1 TABLET(S): at 16:44

## 2023-05-23 RX ADMIN — LOSARTAN POTASSIUM 50 MILLIGRAM(S): 100 TABLET, FILM COATED ORAL at 05:45

## 2023-05-23 RX ADMIN — Medication 650 MILLIGRAM(S): at 01:33

## 2023-05-23 RX ADMIN — OXYCODONE HYDROCHLORIDE 5 MILLIGRAM(S): 5 TABLET ORAL at 20:02

## 2023-05-23 RX ADMIN — Medication 650 MILLIGRAM(S): at 10:39

## 2023-05-23 RX ADMIN — Medication 650 MILLIGRAM(S): at 11:10

## 2023-05-23 RX ADMIN — Medication 100 MILLIGRAM(S): at 05:45

## 2023-05-23 RX ADMIN — Medication 650 MILLIGRAM(S): at 17:15

## 2023-05-23 RX ADMIN — TAMSULOSIN HYDROCHLORIDE 0.4 MILLIGRAM(S): 0.4 CAPSULE ORAL at 21:00

## 2023-05-23 RX ADMIN — ONDANSETRON 4 MILLIGRAM(S): 8 TABLET, FILM COATED ORAL at 20:43

## 2023-05-23 RX ADMIN — Medication 100 MILLIGRAM(S): at 13:50

## 2023-05-23 RX ADMIN — OXYCODONE HYDROCHLORIDE 5 MILLIGRAM(S): 5 TABLET ORAL at 21:02

## 2023-05-23 RX ADMIN — HEPARIN SODIUM 5000 UNIT(S): 5000 INJECTION INTRAVENOUS; SUBCUTANEOUS at 21:03

## 2023-05-23 RX ADMIN — HEPARIN SODIUM 5000 UNIT(S): 5000 INJECTION INTRAVENOUS; SUBCUTANEOUS at 13:53

## 2023-05-23 RX ADMIN — Medication 100 MILLIGRAM(S): at 21:03

## 2023-05-23 RX ADMIN — Medication 650 MILLIGRAM(S): at 02:03

## 2023-05-23 NOTE — PROGRESS NOTE ADULT - SUBJECTIVE AND OBJECTIVE BOX
Interventional Radiology Follow-Up Note    This is a 62y Male s/p Balloon-assisted thrombin injection of pseudoaneurysm on 5/22 in Interventional Radiology with Dr.Halaibeh.     S: Patient seen and examined @ bedside. No complaints offered. RN at bedside, translating per pt request.     Medication:     amLODIPine   Tablet: (05-23)  aspirin  chewable: (05-21)  ceFAZolin   IVPB: (05-23)  heparin   Injectable: (05-21)  heparin   Injectable: (05-23)  hydrochlorothiazide: (05-23)  losartan: (05-23)    Vitals:  T(F): 98.5, Max: 99 (16:38)  HR: 74  BP: 125/80  RR: 18  SpO2: 96%    Physical Exam:  General: Nontoxic, in NAD, A&O x3.  Abdomen: soft, NTND, no peritoneal signs.  Extremities:  Right groin soft with small amount swelling. Left groin clean, dry and intact, soft with no evidence of hematoma, b/l femoral/dp/pt pulses +2. No pedal edema or calf tenderness noted.  Drain Device: left nephrostomy in place attached to ostomy bag with bloody output.     LABS:        Assessment/Plan:  62M s/p post-pcnl bleed angio and embo on 5/18. Reported swelling in the R. groin access site. U/S demonstrated 2cm pseudoaneurysm. Now s/p Balloon-assisted thrombin injection of pseudoaneurysm on 5/22 in Interventional Radiology with Dr.Halaibeh.       -h/h stable  -monitor h/h; transfuse as needed  -trend vs/labs  -Right groin US in 48hr from procedure.   -continue global management per primary team   -Please call IR at extension [phone] with any questions, concerns, or issues regarding above.      Serina Smith PA-C  Available on teams

## 2023-05-23 NOTE — PROGRESS NOTE ADULT - SUBJECTIVE AND OBJECTIVE BOX
UROLOGY DAILY PROGRESS NOTE:     Subjective: Patient seen and examined at bedside. No overnight events. Underwent IR embolization of groin last night.     Objective:  Vital signs  T(F): , Max: 99 (05-22-23 @ 16:38)  HR: 74 (05-23-23 @ 04:10)  BP: 125/80 (05-23-23 @ 04:10)  SpO2: 96% (05-23-23 @ 04:10)  Wt(kg): --    I&O's Summary    22 May 2023 07:01  -  23 May 2023 07:00  --------------------------------------------------------  IN: 1230 mL / OUT: 2210 mL / NET: -980 mL        Gen: NAD  Pulm: No respiratory distress, no subcostal retractions  CV: RRR, no JVD  Abd: Soft, NT, ND  Groin : clean dry intact, no swelling or ecchymosis  Back: NT in place Back pouched drainage bloody     Labs:  05-22  8.02  / 35.2  /x      05-22  x     / x     /0.82                           11.7   8.02  )-----------( 314      ( 22 May 2023 06:35 )             35.2     05-22    137  |  99  |  18  ----------------------------<  99  4.2   |  26  |  0.82    Ca    8.7      22 May 2023 06:33      PT/INR - ( 22 May 2023 06:37 )   PT: 11.7 sec;   INR: 1.01 ratio         PTT - ( 22 May 2023 06:37 )  PTT:26.6 sec    Urine Cx:

## 2023-05-23 NOTE — PROGRESS NOTE ADULT - ASSESSMENT
62y Male s/pL PCNL, post op hematuria, s/p IR L angiogram and embolization, replacement of NT; right groin access pseudoaneurysm     - Continue NT, monitor drainage   - reg diet  - monitor urine output and color   - cont ancef  - f/u heme

## 2023-05-24 LAB
ANION GAP SERPL CALC-SCNC: 16 MMOL/L — SIGNIFICANT CHANGE UP (ref 5–17)
BUN SERPL-MCNC: 14 MG/DL — SIGNIFICANT CHANGE UP (ref 7–23)
CALCIUM SERPL-MCNC: 8.8 MG/DL — SIGNIFICANT CHANGE UP (ref 8.4–10.5)
CELL MATERIAL STONE EST-MCNT: SIGNIFICANT CHANGE UP
CHLORIDE SERPL-SCNC: 87 MMOL/L — LOW (ref 96–108)
CO2 SERPL-SCNC: 24 MMOL/L — SIGNIFICANT CHANGE UP (ref 22–31)
CREAT SERPL-MCNC: 0.78 MG/DL — SIGNIFICANT CHANGE UP (ref 0.5–1.3)
EGFR: 100 ML/MIN/1.73M2 — SIGNIFICANT CHANGE UP
FACT VIII ACT/NOR PPP: 163 % — HIGH (ref 60–125)
FACT XII ACT/NOR PPP: 120 % — SIGNIFICANT CHANGE UP (ref 70–145)
FIBRINOGEN PPP-MCNC: 895 MG/DL — HIGH (ref 200–445)
GLUCOSE SERPL-MCNC: 112 MG/DL — HIGH (ref 70–99)
HCT VFR BLD CALC: 36 % — LOW (ref 39–50)
HGB BLD-MCNC: 12.4 G/DL — LOW (ref 13–17)
LABORATORY COMMENT REPORT: SIGNIFICANT CHANGE UP
MCHC RBC-ENTMCNC: 30.9 PG — SIGNIFICANT CHANGE UP (ref 27–34)
MCHC RBC-ENTMCNC: 34.4 GM/DL — SIGNIFICANT CHANGE UP (ref 32–36)
MCV RBC AUTO: 89.8 FL — SIGNIFICANT CHANGE UP (ref 80–100)
NIDUS STONE QN: SIGNIFICANT CHANGE UP
NRBC # BLD: 0 /100 WBCS — SIGNIFICANT CHANGE UP (ref 0–0)
PLATELET # BLD AUTO: 407 K/UL — HIGH (ref 150–400)
POTASSIUM SERPL-MCNC: 4.2 MMOL/L — SIGNIFICANT CHANGE UP (ref 3.5–5.3)
POTASSIUM SERPL-SCNC: 4.2 MMOL/L — SIGNIFICANT CHANGE UP (ref 3.5–5.3)
RBC # BLD: 4.01 M/UL — LOW (ref 4.2–5.8)
RBC # FLD: 11.6 % — SIGNIFICANT CHANGE UP (ref 10.3–14.5)
SODIUM SERPL-SCNC: 127 MMOL/L — LOW (ref 135–145)
VWF AG ACT/NOR PPP IA: 226 % — HIGH (ref 63–170)
VWF:RCO ACT/NOR PPP PL AGG: 208 % — HIGH (ref 45–133)
WBC # BLD: 13.97 K/UL — HIGH (ref 3.8–10.5)
WBC # FLD AUTO: 13.97 K/UL — HIGH (ref 3.8–10.5)

## 2023-05-24 PROCEDURE — 99231 SBSQ HOSP IP/OBS SF/LOW 25: CPT

## 2023-05-24 RX ORDER — POLYETHYLENE GLYCOL 3350 17 G/17G
17 POWDER, FOR SOLUTION ORAL DAILY
Refills: 0 | Status: DISCONTINUED | OUTPATIENT
Start: 2023-05-24 | End: 2023-05-27

## 2023-05-24 RX ORDER — LIDOCAINE 4 G/100G
1 CREAM TOPICAL ONCE
Refills: 0 | Status: COMPLETED | OUTPATIENT
Start: 2023-05-24 | End: 2023-05-24

## 2023-05-24 RX ORDER — SENNA PLUS 8.6 MG/1
2 TABLET ORAL AT BEDTIME
Refills: 0 | Status: DISCONTINUED | OUTPATIENT
Start: 2023-05-24 | End: 2023-05-27

## 2023-05-24 RX ORDER — SODIUM CHLORIDE 9 MG/ML
1000 INJECTION INTRAMUSCULAR; INTRAVENOUS; SUBCUTANEOUS
Refills: 0 | Status: DISCONTINUED | OUTPATIENT
Start: 2023-05-24 | End: 2023-05-26

## 2023-05-24 RX ORDER — DIPHENHYDRAMINE HCL 50 MG
25 CAPSULE ORAL ONCE
Refills: 0 | Status: COMPLETED | OUTPATIENT
Start: 2023-05-24 | End: 2023-05-24

## 2023-05-24 RX ORDER — HYDROMORPHONE HYDROCHLORIDE 2 MG/ML
0.2 INJECTION INTRAMUSCULAR; INTRAVENOUS; SUBCUTANEOUS ONCE
Refills: 0 | Status: DISCONTINUED | OUTPATIENT
Start: 2023-05-24 | End: 2023-05-24

## 2023-05-24 RX ADMIN — LIDOCAINE 1 PATCH: 4 CREAM TOPICAL at 06:13

## 2023-05-24 RX ADMIN — Medication 100 MILLIGRAM(S): at 05:09

## 2023-05-24 RX ADMIN — LOSARTAN POTASSIUM 50 MILLIGRAM(S): 100 TABLET, FILM COATED ORAL at 05:06

## 2023-05-24 RX ADMIN — OXYCODONE HYDROCHLORIDE 5 MILLIGRAM(S): 5 TABLET ORAL at 04:04

## 2023-05-24 RX ADMIN — Medication 100 MILLIGRAM(S): at 21:03

## 2023-05-24 RX ADMIN — HYDROMORPHONE HYDROCHLORIDE 0.2 MILLIGRAM(S): 2 INJECTION INTRAMUSCULAR; INTRAVENOUS; SUBCUTANEOUS at 00:27

## 2023-05-24 RX ADMIN — OXYCODONE HYDROCHLORIDE 5 MILLIGRAM(S): 5 TABLET ORAL at 05:04

## 2023-05-24 RX ADMIN — Medication 25 MILLIGRAM(S): at 00:41

## 2023-05-24 RX ADMIN — POLYETHYLENE GLYCOL 3350 17 GRAM(S): 17 POWDER, FOR SOLUTION ORAL at 10:09

## 2023-05-24 RX ADMIN — Medication 81 MILLIGRAM(S): at 13:51

## 2023-05-24 RX ADMIN — LIDOCAINE 1 PATCH: 4 CREAM TOPICAL at 00:29

## 2023-05-24 RX ADMIN — HYDROMORPHONE HYDROCHLORIDE 0.2 MILLIGRAM(S): 2 INJECTION INTRAMUSCULAR; INTRAVENOUS; SUBCUTANEOUS at 01:00

## 2023-05-24 RX ADMIN — HEPARIN SODIUM 5000 UNIT(S): 5000 INJECTION INTRAVENOUS; SUBCUTANEOUS at 05:07

## 2023-05-24 RX ADMIN — HEPARIN SODIUM 5000 UNIT(S): 5000 INJECTION INTRAVENOUS; SUBCUTANEOUS at 13:51

## 2023-05-24 RX ADMIN — Medication 1 TABLET(S): at 05:11

## 2023-05-24 RX ADMIN — SENNA PLUS 2 TABLET(S): 8.6 TABLET ORAL at 21:03

## 2023-05-24 RX ADMIN — TAMSULOSIN HYDROCHLORIDE 0.4 MILLIGRAM(S): 0.4 CAPSULE ORAL at 21:03

## 2023-05-24 RX ADMIN — ATORVASTATIN CALCIUM 10 MILLIGRAM(S): 80 TABLET, FILM COATED ORAL at 21:04

## 2023-05-24 RX ADMIN — AMLODIPINE BESYLATE 10 MILLIGRAM(S): 2.5 TABLET ORAL at 05:07

## 2023-05-24 RX ADMIN — Medication 100 MILLIGRAM(S): at 13:54

## 2023-05-24 RX ADMIN — ONDANSETRON 4 MILLIGRAM(S): 8 TABLET, FILM COATED ORAL at 05:11

## 2023-05-24 NOTE — PROGRESS NOTE ADULT - SUBJECTIVE AND OBJECTIVE BOX
Interventional Radiology Follow-Up Note.      This is a 62y Male s/p Balloon-assisted thrombin injection of pseudoaneurysm on 5/22 in Interventional Radiology with Dr.Halaibeh.     S: Patient seen and examined @ bedside. No complaints offered.     Medication:     amLODIPine   Tablet: (05-24)  aspirin  chewable: (05-24)  ceFAZolin   IVPB: (05-24)  heparin   Injectable: (05-24)  hydrochlorothiazide: (05-24)  losartan: (05-24)    Vitals:   T(F): 98.4, Max: 99.3 (00:53)  HR: 90  BP: 132/90  RR: 18  SpO2: 97%    Physical Exam:  General: Nontoxic, in NAD, A&O x3.  Abdomen: soft, NTND, no peritoneal signs.  Extremities:  Right groin soft with small amount swelling; now decreased in size, soft groin.    b/l femoral/dp/pt pulses +2. No pedal edema or calf tenderness noted.  Drain Device: left nephrostomy in place attached to ostomy bag bloody output. clearing.           LABS:  Na: 127 (05-24 @ 06:58), 135 (05-23 @ 06:42), 137 (05-22 @ 06:33)  K: 4.2 (05-24 @ 06:58), 3.8 (05-23 @ 06:42), 4.2 (05-22 @ 06:33)  Cl: 87 (05-24 @ 06:58), 97 (05-23 @ 06:42), 99 (05-22 @ 06:33)  CO2: 24 (05-24 @ 06:58), 23 (05-23 @ 06:42), 26 (05-22 @ 06:33)  BUN: 14 (05-24 @ 06:58), 21 (05-23 @ 06:42), 18 (05-22 @ 06:33)  Cr: 0.78 (05-24 @ 06:58), 0.83 (05-23 @ 06:42), 0.82 (05-22 @ 06:33)  Glu: 112(05-24 @ 06:58), 86(05-23 @ 06:42), 99(05-22 @ 06:33)    Hgb: 12.4 (05-24 @ 06:56), 11.6 (05-23 @ 06:42), 11.7 (05-22 @ 06:35)  Hct: 36.0 (05-24 @ 06:56), 33.7 (05-23 @ 06:42), 35.2 (05-22 @ 06:35)  WBC: 13.97 (05-24 @ 06:56), 8.54 (05-23 @ 06:42), 8.02 (05-22 @ 06:35)  Plt: 407 (05-24 @ 06:56), 342 (05-23 @ 06:42), 314 (05-22 @ 06:35)    INR: 1.01 05-22-23 @ 06:37  PTT: 26.6 05-22-23 @ 06:37                          Assessment/Plan:  62M s/p post-pcnl bleed angio and embo on 5/18. Reported swelling in the R. groin access site. U/S demonstrated 2cm pseudoaneurysm. Now s/p Balloon-assisted thrombin injection of pseudoaneurysm on 5/22 in Interventional Radiology with Dr.Halaibeh.       -h/h stable  - Pending RLE Arterial duplex 48hr from procedure. (s/p Balloon-assisted thrombin injection of pseudoaneurysm on 5/22 in Interventional Radiology with Dr.Halaibeh. )  -continue global management per primary team   -Please call IR at extension [phone] with any questions, concerns, or issues regarding abo

## 2023-05-24 NOTE — PROVIDER CONTACT NOTE (OTHER) - ASSESSMENT
/96, HR 91, other VSS. Patient complaining of 6/10 abdominal & left flank pain. Oxycodone 5mg administered
VSS, pt complaining of 7/10 pain in abdomen and left flank. Son at bedside asking to speak to provider.
all VSS, no s/s of distress, upon assessment and receiving pt from PACU, L flank nephro tube dressing w/ [place] and Tegaderm saturated in jayesh red blood and bed and gown saturated as well.
Pt AOx3 on room air. Noted with +2 swelling to right groin incision site, site is c/d/i. B/L lower extremities PD and TB pulses present, sensation wdl, ROM wdl. Pt endorses discomfort to right groin with activity.
Pt AOx3 on room air. Pt denies pain or discomfort at this time. Denies dizziness, sob or chest pain.

## 2023-05-24 NOTE — PROGRESS NOTE ADULT - ASSESSMENT
62M s/pL PCNL, post op hematuria, s/p IR L angiogram and embolization, replacement of NT; right groin access pseudoaneurysm     Plan:  - Continue NT, monitor drainage   - reg diet  - monitor urine output and color   - cont ancef  - f/u heme

## 2023-05-24 NOTE — PROGRESS NOTE ADULT - SUBJECTIVE AND OBJECTIVE BOX
Subjective:  No acute overnight events.   Patient seen and examined at bedside with surgical team during morning rounds. Admits to left flank pain and LLQ pain.      Exam:  Gen: NAD  Pulm: No respiratory distress, no subcostal retractions  Abd: Soft, + LLQ tenderness  Groin : clean dry intact, no swelling or ecchymosis  Back: NT in place back pouched, bloody drainage      T(C): 36.4 (05-24-23 @ 04:00), Max: 37.4 (05-24-23 @ 00:53)  HR: 94 (05-24-23 @ 05:05) (81 - 94)  BP: 162/97 (05-24-23 @ 05:05) (105/70 - 172/96)  RR: 18 (05-24-23 @ 04:00) (18 - 18)  SpO2: 98% (05-24-23 @ 04:00) (96% - 100%)      05-22-23 @ 07:01  -  05-23-23 @ 07:00  --------------------------------------------------------  IN: 1230 mL / OUT: 2210 mL / NET: -980 mL    05-23-23 @ 07:01  -  05-24-23 @ 06:59  --------------------------------------------------------  IN: 2065 mL / OUT: 1910 mL / NET: 155 mL        LABS:  cret                        11.6   8.54  )-----------( 342      ( 23 May 2023 06:42 )             33.7     05-23    135  |  97  |  21  ----------------------------<  86  3.8   |  23  |  0.83    Ca    8.5      23 May 2023 06:42            acetaminophen     Tablet .. 650 milliGRAM(s) Oral every 6 hours PRN  amLODIPine   Tablet 10 milliGRAM(s) Oral daily  aspirin  chewable 81 milliGRAM(s) Oral daily  atorvastatin 10 milliGRAM(s) Oral at bedtime  calcium carbonate    500 mG (Tums) Chewable 1 Tablet(s) Chew every 6 hours PRN  ceFAZolin   IVPB 2000 milliGRAM(s) IV Intermittent every 8 hours  heparin   Injectable 5000 Unit(s) SubCutaneous every 8 hours  hydrochlorothiazide 12.5 milliGRAM(s) Oral daily  losartan 50 milliGRAM(s) Oral daily  ondansetron Injectable 4 milliGRAM(s) IV Push every 6 hours PRN  oxyCODONE    IR 5 milliGRAM(s) Oral every 6 hours PRN  senna 2 Tablet(s) Oral at bedtime PRN  tamsulosin 0.4 milliGRAM(s) Oral at bedtime    Subjective:  No acute overnight events.   Patient seen and examined at bedside with urology team during morning rounds. Admits to left flank pain and LLQ pain.      Exam:  Gen: NAD  Pulm: No respiratory distress, no subcostal retractions  Abd: Soft, + LLQ tenderness  Groin : clean dry intact, no swelling or ecchymosis  Back: NT in place back pouched, bloody drainage      T(C): 36.4 (05-24-23 @ 04:00), Max: 37.4 (05-24-23 @ 00:53)  HR: 94 (05-24-23 @ 05:05) (81 - 94)  BP: 162/97 (05-24-23 @ 05:05) (105/70 - 172/96)  RR: 18 (05-24-23 @ 04:00) (18 - 18)  SpO2: 98% (05-24-23 @ 04:00) (96% - 100%)      05-22-23 @ 07:01  -  05-23-23 @ 07:00  --------------------------------------------------------  IN: 1230 mL / OUT: 2210 mL / NET: -980 mL    05-23-23 @ 07:01  -  05-24-23 @ 06:59  --------------------------------------------------------  IN: 2065 mL / OUT: 1910 mL / NET: 155 mL        LABS:  cret                        11.6   8.54  )-----------( 342      ( 23 May 2023 06:42 )             33.7     05-23    135  |  97  |  21  ----------------------------<  86  3.8   |  23  |  0.83    Ca    8.5      23 May 2023 06:42            acetaminophen     Tablet .. 650 milliGRAM(s) Oral every 6 hours PRN  amLODIPine   Tablet 10 milliGRAM(s) Oral daily  aspirin  chewable 81 milliGRAM(s) Oral daily  atorvastatin 10 milliGRAM(s) Oral at bedtime  calcium carbonate    500 mG (Tums) Chewable 1 Tablet(s) Chew every 6 hours PRN  ceFAZolin   IVPB 2000 milliGRAM(s) IV Intermittent every 8 hours  heparin   Injectable 5000 Unit(s) SubCutaneous every 8 hours  hydrochlorothiazide 12.5 milliGRAM(s) Oral daily  losartan 50 milliGRAM(s) Oral daily  ondansetron Injectable 4 milliGRAM(s) IV Push every 6 hours PRN  oxyCODONE    IR 5 milliGRAM(s) Oral every 6 hours PRN  senna 2 Tablet(s) Oral at bedtime PRN  tamsulosin 0.4 milliGRAM(s) Oral at bedtime

## 2023-05-24 NOTE — PROVIDER CONTACT NOTE (OTHER) - BACKGROUND
5/15 s/p pcnl, stone removal, nephrostomy tube placement. 5/18 s/p nephrostomy tube replacement and renal angiogram.
Patient admitted 5/15 with kidney stone, s/p L PCNL and nephrostomy tube. s/p IR balloon assisted thrombin injection of pseudoaneurysm on 5/22.
Patient s/p L PCNL and nephrostomy tube on 5/15. s/p balloon assisted thrombin injection of pseudoaneurysm in IR on 5/22.
5/15 pt admitted for calculus of kidney, 5/15 s/p PCNL with nephrostomy tube placement, 5/18 s/p nephrostomy tube replacement and angiogram via right groin.
s/p IR L angiogram and embolization and replacement of Nephro tube 5/18

## 2023-05-24 NOTE — PROVIDER CONTACT NOTE (OTHER) - ACTION/TREATMENT ORDERED:
dressing changed, monitor dressing for additional bleeding
PA aware. Awaiting provider to come to bedside. Oxycodone ordered and administered. Will continue to assess pain and output.
PA aware agreed to hold losartan and amlodipine at this time.
PA aware. Will recheck BP 1 hour after oxy administered.
PA made aware no new orders at this time

## 2023-05-24 NOTE — PROVIDER CONTACT NOTE (OTHER) - SITUATION
Noted +2 swelling to R groin incision site.
Pt BP is 101/67
pt L nephro tube dressing saturated in jayesh red blood
Patient BP elevated
Patient has bloody output from nephrostomy tube and his urine output is also bloody.

## 2023-05-24 NOTE — PROVIDER CONTACT NOTE (OTHER) - REASON
Noted +2 swelling to R groin incision site.
bp elevated
/67
blood in nephrostomy tube
pt L nephro tube dressing saturated in jayesh red blood.

## 2023-05-24 NOTE — PROVIDER CONTACT NOTE (OTHER) - RECOMMENDATIONS
PA made aware to hold losartan and amlodipine at this time.
Notify provider, come to bedside to assess pt. additional pain meds ordered?
Notify provider.
Pt to minimize movement of right leg at this time.
provider aware

## 2023-05-25 LAB
ANION GAP SERPL CALC-SCNC: 12 MMOL/L — SIGNIFICANT CHANGE UP (ref 5–17)
APTT BLD: 25.9 SEC — LOW (ref 27.5–35.5)
BUN SERPL-MCNC: 15 MG/DL — SIGNIFICANT CHANGE UP (ref 7–23)
CALCIUM SERPL-MCNC: 8.4 MG/DL — SIGNIFICANT CHANGE UP (ref 8.4–10.5)
CHLORIDE SERPL-SCNC: 94 MMOL/L — LOW (ref 96–108)
CO2 SERPL-SCNC: 25 MMOL/L — SIGNIFICANT CHANGE UP (ref 22–31)
CREAT SERPL-MCNC: 0.81 MG/DL — SIGNIFICANT CHANGE UP (ref 0.5–1.3)
EGFR: 99 ML/MIN/1.73M2 — SIGNIFICANT CHANGE UP
FACT XIII ACT/NOR PPP CHRO: 50 % — LOW (ref 51–163)
GLUCOSE SERPL-MCNC: 93 MG/DL — SIGNIFICANT CHANGE UP (ref 70–99)
HCT VFR BLD CALC: 30.7 % — LOW (ref 39–50)
HCT VFR BLD CALC: 32.8 % — LOW (ref 39–50)
HGB BLD-MCNC: 11 G/DL — LOW (ref 13–17)
HGB BLD-MCNC: 11.4 G/DL — LOW (ref 13–17)
INR BLD: 1.1 RATIO — SIGNIFICANT CHANGE UP (ref 0.88–1.16)
MCHC RBC-ENTMCNC: 31.5 PG — SIGNIFICANT CHANGE UP (ref 27–34)
MCHC RBC-ENTMCNC: 32.4 PG — SIGNIFICANT CHANGE UP (ref 27–34)
MCHC RBC-ENTMCNC: 34.8 GM/DL — SIGNIFICANT CHANGE UP (ref 32–36)
MCHC RBC-ENTMCNC: 35.8 GM/DL — SIGNIFICANT CHANGE UP (ref 32–36)
MCV RBC AUTO: 90.3 FL — SIGNIFICANT CHANGE UP (ref 80–100)
MCV RBC AUTO: 90.6 FL — SIGNIFICANT CHANGE UP (ref 80–100)
NRBC # BLD: 0 /100 WBCS — SIGNIFICANT CHANGE UP (ref 0–0)
NRBC # BLD: 0 /100 WBCS — SIGNIFICANT CHANGE UP (ref 0–0)
PLATELET # BLD AUTO: 405 K/UL — HIGH (ref 150–400)
PLATELET # BLD AUTO: 411 K/UL — HIGH (ref 150–400)
POTASSIUM SERPL-MCNC: 3.6 MMOL/L — SIGNIFICANT CHANGE UP (ref 3.5–5.3)
POTASSIUM SERPL-SCNC: 3.6 MMOL/L — SIGNIFICANT CHANGE UP (ref 3.5–5.3)
PROTHROM AB SERPL-ACNC: 12.8 SEC — SIGNIFICANT CHANGE UP (ref 10.5–13.4)
RBC # BLD: 3.4 M/UL — LOW (ref 4.2–5.8)
RBC # BLD: 3.62 M/UL — LOW (ref 4.2–5.8)
RBC # FLD: 11.9 % — SIGNIFICANT CHANGE UP (ref 10.3–14.5)
RBC # FLD: 11.9 % — SIGNIFICANT CHANGE UP (ref 10.3–14.5)
SODIUM SERPL-SCNC: 131 MMOL/L — LOW (ref 135–145)
WBC # BLD: 9.35 K/UL — SIGNIFICANT CHANGE UP (ref 3.8–10.5)
WBC # BLD: 9.51 K/UL — SIGNIFICANT CHANGE UP (ref 3.8–10.5)
WBC # FLD AUTO: 9.35 K/UL — SIGNIFICANT CHANGE UP (ref 3.8–10.5)
WBC # FLD AUTO: 9.51 K/UL — SIGNIFICANT CHANGE UP (ref 3.8–10.5)

## 2023-05-25 PROCEDURE — 93926 LOWER EXTREMITY STUDY: CPT | Mod: 26,RT

## 2023-05-25 PROCEDURE — 99232 SBSQ HOSP IP/OBS MODERATE 35: CPT

## 2023-05-25 PROCEDURE — 99233 SBSQ HOSP IP/OBS HIGH 50: CPT

## 2023-05-25 RX ORDER — TRANEXAMIC ACID 100 MG/ML
650 INJECTION, SOLUTION INTRAVENOUS EVERY 8 HOURS
Refills: 0 | Status: DISCONTINUED | OUTPATIENT
Start: 2023-05-25 | End: 2023-05-26

## 2023-05-25 RX ADMIN — SENNA PLUS 2 TABLET(S): 8.6 TABLET ORAL at 21:11

## 2023-05-25 RX ADMIN — Medication 100 MILLIGRAM(S): at 05:32

## 2023-05-25 RX ADMIN — Medication 100 MILLIGRAM(S): at 21:10

## 2023-05-25 RX ADMIN — Medication 100 MILLIGRAM(S): at 14:16

## 2023-05-25 RX ADMIN — TRANEXAMIC ACID 213 MILLIGRAM(S): 100 INJECTION, SOLUTION INTRAVENOUS at 23:01

## 2023-05-25 RX ADMIN — LOSARTAN POTASSIUM 50 MILLIGRAM(S): 100 TABLET, FILM COATED ORAL at 06:39

## 2023-05-25 RX ADMIN — AMLODIPINE BESYLATE 10 MILLIGRAM(S): 2.5 TABLET ORAL at 11:15

## 2023-05-25 RX ADMIN — ATORVASTATIN CALCIUM 10 MILLIGRAM(S): 80 TABLET, FILM COATED ORAL at 21:11

## 2023-05-25 RX ADMIN — TAMSULOSIN HYDROCHLORIDE 0.4 MILLIGRAM(S): 0.4 CAPSULE ORAL at 21:11

## 2023-05-25 RX ADMIN — SODIUM CHLORIDE 75 MILLILITER(S): 9 INJECTION INTRAMUSCULAR; INTRAVENOUS; SUBCUTANEOUS at 00:51

## 2023-05-25 RX ADMIN — SODIUM CHLORIDE 75 MILLILITER(S): 9 INJECTION INTRAMUSCULAR; INTRAVENOUS; SUBCUTANEOUS at 19:39

## 2023-05-25 NOTE — PROGRESS NOTE ADULT - SUBJECTIVE AND OBJECTIVE BOX
Kulwant Sandoval MD PGY-4 Hematology Oncology  Reachable on TEAMS    INTERVAL HPI/OVERNIGHT EVENTS:  Patient S&E at bedside. No acute events, no active complaints    VITAL SIGNS:  T(F): 98.4 (05-25-23 @ 09:57)  HR: 71 (05-25-23 @ 09:57)  BP: 115/70 (05-25-23 @ 09:57)  RR: 18 (05-25-23 @ 09:57)  SpO2: 95% (05-25-23 @ 09:57)  Wt(kg): --    PHYSICAL EXAM:    Constitutional: NAD  Eyes: EOMI, sclera non-icteric  Neck: supple, no masses, no JVD  Respiratory: CTA b/l, good air entry b/l  Cardiovascular: RRR, no M/R/G  Gastrointestinal: soft, NTND, no masses palpable, + BS, no hepatosplenomegaly  Extremities: no c/c/e  Neurological: AAOx3      MEDICATIONS  (STANDING):  amLODIPine   Tablet 10 milliGRAM(s) Oral daily  atorvastatin 10 milliGRAM(s) Oral at bedtime  ceFAZolin   IVPB 2000 milliGRAM(s) IV Intermittent every 8 hours  hydrochlorothiazide 12.5 milliGRAM(s) Oral daily  losartan 50 milliGRAM(s) Oral daily  polyethylene glycol 3350 17 Gram(s) Oral daily  senna 2 Tablet(s) Oral at bedtime  sodium chloride 0.9%. 1000 milliLiter(s) (75 mL/Hr) IV Continuous <Continuous>  tamsulosin 0.4 milliGRAM(s) Oral at bedtime    MEDICATIONS  (PRN):  acetaminophen     Tablet .. 650 milliGRAM(s) Oral every 6 hours PRN Mild Pain (1 - 3)  calcium carbonate    500 mG (Tums) Chewable 1 Tablet(s) Chew every 6 hours PRN Heartburn  ondansetron Injectable 4 milliGRAM(s) IV Push every 6 hours PRN Nausea and/or Vomiting  oxyCODONE    IR 5 milliGRAM(s) Oral every 6 hours PRN Moderate Pain (4 - 6)      Allergies    No Known Allergies    Intolerances        LABS:                        11.0   9.35  )-----------( 405      ( 25 May 2023 07:13 )             30.7     05-25    131<L>  |  94<L>  |  15  ----------------------------<  93  3.6   |  25  |  0.81    Ca    8.4      25 May 2023 07:13      PT/INR - ( 25 May 2023 07:15 )   PT: 12.8 sec;   INR: 1.10 ratio         PTT - ( 25 May 2023 07:15 )  PTT:25.9 sec      RADIOLOGY & ADDITIONAL TESTS:  Studies reviewed.

## 2023-05-25 NOTE — PROGRESS NOTE ADULT - SUBJECTIVE AND OBJECTIVE BOX
Interventional Radiology Follow-Up Note.    Patient seen and examined @ bedside.    This is a 62y Male s/p Balloon-assisted thrombin injection of pseudoaneurysm on 5/22 in Interventional Radiology with Dr.Halaibeh.     Patient reports some tenderness at right groin site.      Medication:  amLODIPine   Tablet: (05-24)  aspirin  chewable: (05-24)  ceFAZolin   IVPB: (05-25)  heparin   Injectable: (05-24)  hydrochlorothiazide: (05-25)  losartan: (05-25)    Vitals:  T(F): 97.8, Max: 98.6 (13:15)  HR: 82  BP: 115/77  RR: 18  SpO2: 98%    Physical Exam:  General: Nontoxic, in NAD.  Abdomen: soft, NTND.   Extremities:  R groin clean, dry and intact, small area of swelling but soft with no evidence of hematoma, R femoral pulse 2+, b/l dp/pt pulses 2+. No pedal edema or calf tenderness noted.  L PCN Device: Drain intact attached to ostomy bag.  Flushed with 5cc NS w/o difficulty, no leaking.   24hr Drain output: 200cc blood-tinged urine          LABS:  Na: 127 (05-24 @ 06:58), 135 (05-23 @ 06:42)  K: 4.2 (05-24 @ 06:58), 3.8 (05-23 @ 06:42)  Cl: 87 (05-24 @ 06:58), 97 (05-23 @ 06:42)  CO2: 24 (05-24 @ 06:58), 23 (05-23 @ 06:42)  BUN: 14 (05-24 @ 06:58), 21 (05-23 @ 06:42)  Cr: 0.78 (05-24 @ 06:58), 0.83 (05-23 @ 06:42)  Glu: 112(05-24 @ 06:58), 86(05-23 @ 06:42)    Hgb: 11.0 (05-25 @ 07:13), 12.4 (05-24 @ 06:56), 11.6 (05-23 @ 06:42)  Hct: 30.7 (05-25 @ 07:13), 36.0 (05-24 @ 06:56), 33.7 (05-23 @ 06:42)  WBC: 9.35 (05-25 @ 07:13), 13.97 (05-24 @ 06:56), 8.54 (05-23 @ 06:42)  Plt: 405 (05-25 @ 07:13), 407 (05-24 @ 06:56), 342 (05-23 @ 06:42)    INR: 1.10 05-25-23 @ 07:15  PTT: 25.9 05-25-23 @ 07:15                    Assessment/Plan:  62y Male with PMH HTN, HLD, 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 Patient voiding dark red urine, left NT with dark red output with clots.   Pt s/p Renal angiogram and embolization with exchange of left nephrostomy drain on 5/18/23 in Interventional Radiology .   Reported swelling in the R. groin access site. U/S demonstrated 2cm pseudoaneurysm. Now s/p Balloon-assisted thrombin injection of pseudoaneurysm on 5/22 in Interventional Radiology with Dr. Halaibeh.     - Trend vs/labs, H/H stable  - Pending RLE Arterial duplex today (48hr from procedure)   -continue global management per primary team      Please call IR at [phone] with any questions, concerns, or issues regarding above.    Also available on [employer] TEAMS.

## 2023-05-25 NOTE — PROGRESS NOTE ADULT - ATTENDING COMMENTS
Plan as per Dr hoenig   transfuse 2 units of blood   Inguinal US at the site of embolization
above reviewed, f/u from IR procedure  appreciate plan for Hematology outpt f/u  films reviewed, pt seen  f/u for d/c planning as per IR rec  remove nephrostomy
d/w pt, son  cont blood with clots  unclear if needing angioembolization- will have IR eval for angio/tube removal  for optimal control
pt seen and examined  US f/u films of groin pseudoaneurysm reviewed  potential re-bleed post angioembolization- d/w pt and son, may need repeat embolization
received 2 units of PRBC's   hemodynamically stable   US showed pseudoaneurysm  follow up with IR for management of pseudoaneurysm   keep nephrostomy tubes for now
Pt seen and examined  events from weekend reviewed  d/w son via phone as well as patient  s/p transfusion with excellent response  urine clearing from nephrostomy- plan removal tomorrow AM, continuing pouch, after IR eval/management today  voiding clear  will reassess, possible d/c planning tomorrow
pt seen and examined  improving color post embolization  will f/u h&h  acute blood loss anemia- may require transfusion if needed
62M who had recent left percutaneous stone removal (5/15/23), complicated by post-op hematuria and right groin access pseudoaneurysm. Heme consulted for recurrent bleeding in setting of normal coagulation studies.     # Recurrent bleeding: Per Urology, he had more post-op bleeding than expected. CBC shows mild anemia with normal platelet count. PT, PTT, and INR normal. Fibrinogen and vWD panel elevated. Factor XIII borderline low (50%), but hemostasis is usually achieved with levels > 5%.  - Trend CBC with differential daily. Continue supportive transfusions as needed to maintain Hgb > 7 and plt > 10.   - Recommend trial of  mg TID x 3 days. If he continues to have excessive bleeding after that, can consider cryoprecipitate, which contains factor XIII.

## 2023-05-25 NOTE — PROGRESS NOTE ADULT - ASSESSMENT
62y Male s/p L. PCNL, post op hematuria, s/p IR L angiogram and embolization, replacement of NT; c/b right groin access pseudoaneurysm. Heme consulted for recurrent bleeding in setting of normal coags    #recurrent bleeding in setting of normal coags  - PT/INR/PTT normal  - send von willebrand panel, and factor XIII level and activity  - send fibrinogen, d-dimer  - will need platelet function analysis tests outpatient  - outpatient follow up once stable for discharge, please include hematology oncology f/u at [facility] in discharge note   62y Male s/p L. PCNL, post op hematuria, s/p IR L angiogram and embolization, replacement of NT; c/b right groin access pseudoaneurysm. Heme consulted for recurrent bleeding in setting of normal coags    #recurrent bleeding in setting of normal coags  - PT/INR/PTT normal  - send von willebrand panel, and factor XIII level and activity  - fibrinogen elevated, vWF activity, and antigen, factor 8 elevated, factor 11 normal  - factor 13 and vW multimers pending  - will need platelet function analysis tests outpatient  - outpatient follow up once stable for discharge, please include hematology oncology f/u at [facility] in discharge note

## 2023-05-25 NOTE — PROGRESS NOTE ADULT - ASSESSMENT
62y Male s/p L. PCNL, post op hematuria, s/p IR L angiogram and embolization, replacement of NT; c/b right groin access pseudoaneurysm. Heme consulted for recurrent bleeding in setting of normal coags    #recurrent bleeding in setting of normal coags  - PT/INR/PTT normal  - send von willebrand panel, and factor XIII level and activity  - fibrinogen elevated, vWF activity, and antigen, factor 8 elevated, factor 11 normal  - factor 13 level mildly low 50%, can give 5 units of cryoprecipitate  - vW multimers pending  - will need platelet function analysis tests outpatient  - outpatient follow up once stable for discharge, please include hematology oncology f/u at [facility] in discharge note    62y Male s/p L. PCNL, post op hematuria, s/p IR L angiogram and embolization, replacement of NT; c/b right groin access pseudoaneurysm. Heme consulted for recurrent bleeding in setting of normal coags    #recurrent bleeding in setting of normal coags  - PT/INR/PTT normal  - send von willebrand panel, and factor XIII level and activity  - fibrinogen elevated, vWF activity, and antigen, factor 8 elevated, factor 11 normal  - factor 13 level mildly low 50%, if still bleeding, can give transexamic acid 650 TID x3 days  - vW multimers pending  - will need platelet function analysis tests outpatient  - outpatient follow up once stable for discharge, please include hematology oncology f/u at [facility] [place] in discharge note    62y Male s/p L. PCNL, post op hematuria, s/p IR L angiogram and embolization, replacement of NT; c/b right groin access pseudoaneurysm. Heme consulted for recurrent bleeding in setting of normal coags    #recurrent bleeding in setting of normal coags  - PT/INR/PTT normal  - send von willebrand panel, and factor XIII level and activity  - fibrinogen elevated, vWF activity, and antigen, factor 8 elevated, factor 11 normal  - factor 13 level mildly low 50%, if still bleeding, can give tranexamic acid 650 TID x3 days, if still refractory will consider cryoprecipitate  - vW multimers pending  - will need platelet function analysis tests outpatient  - outpatient follow up once stable for discharge, please include hematology oncology f/u at [facility] in discharge note

## 2023-05-25 NOTE — PROGRESS NOTE ADULT - SUBJECTIVE AND OBJECTIVE BOX
Kulwant Sandoval MD PGY-4 Hematology Oncology  Reachable on TEAMS    INTERVAL HPI/OVERNIGHT EVENTS:  Patient S&E at bedside. No acute events, no active complaints    VITAL SIGNS:  T(F): 98.5 (05-25-23 @ 12:54)  HR: 77 (05-25-23 @ 12:54)  BP: 110/72 (05-25-23 @ 12:54)  RR: 18 (05-25-23 @ 12:54)  SpO2: 96% (05-25-23 @ 12:54)  Wt(kg): --    PHYSICAL EXAM:    Constitutional: NAD  Eyes: EOMI, sclera non-icteric  Neck: supple, no masses, no JVD  Respiratory: CTA b/l, good air entry b/l  Cardiovascular: RRR, no M/R/G  Gastrointestinal: soft, NTND, no masses palpable, + BS, no hepatosplenomegaly  Extremities: no c/c/e  Neurological: AAOx3      MEDICATIONS  (STANDING):  amLODIPine   Tablet 10 milliGRAM(s) Oral daily  atorvastatin 10 milliGRAM(s) Oral at bedtime  ceFAZolin   IVPB 2000 milliGRAM(s) IV Intermittent every 8 hours  hydrochlorothiazide 12.5 milliGRAM(s) Oral daily  losartan 50 milliGRAM(s) Oral daily  polyethylene glycol 3350 17 Gram(s) Oral daily  senna 2 Tablet(s) Oral at bedtime  sodium chloride 0.9%. 1000 milliLiter(s) (75 mL/Hr) IV Continuous <Continuous>  tamsulosin 0.4 milliGRAM(s) Oral at bedtime    MEDICATIONS  (PRN):  acetaminophen     Tablet .. 650 milliGRAM(s) Oral every 6 hours PRN Mild Pain (1 - 3)  calcium carbonate    500 mG (Tums) Chewable 1 Tablet(s) Chew every 6 hours PRN Heartburn  ondansetron Injectable 4 milliGRAM(s) IV Push every 6 hours PRN Nausea and/or Vomiting  oxyCODONE    IR 5 milliGRAM(s) Oral every 6 hours PRN Moderate Pain (4 - 6)      Allergies    No Known Allergies    Intolerances        LABS:                        11.4   9.51  )-----------( 411      ( 25 May 2023 13:23 )             32.8     05-25    131<L>  |  94<L>  |  15  ----------------------------<  93  3.6   |  25  |  0.81    Ca    8.4      25 May 2023 07:13      PT/INR - ( 25 May 2023 07:15 )   PT: 12.8 sec;   INR: 1.10 ratio         PTT - ( 25 May 2023 07:15 )  PTT:25.9 sec      RADIOLOGY & ADDITIONAL TESTS:  Studies reviewed.   Kulwant Sandoval MD PGY-4 Hematology Oncology  Reachable on TEAMS    INTERVAL HPI/OVERNIGHT EVENTS:  Patient S&E at bedside. Bengali  [id number]. Back tenderness is resolved, +groin pain, NPO for procedure.     VITAL SIGNS:  T(F): 98.5 (05-25-23 @ 12:54)  HR: 77 (05-25-23 @ 12:54)  BP: 110/72 (05-25-23 @ 12:54)  RR: 18 (05-25-23 @ 12:54)  SpO2: 96% (05-25-23 @ 12:54)  Wt(kg): --    PHYSICAL EXAM:    Constitutional: NAD  Eyes: EOMI, sclera non-icteric  Neck: supple, no masses, no JVD  Respiratory: CTA b/l, good air entry b/l  Cardiovascular: RRR, no M/R/G  Gastrointestinal: soft, NTND, no masses palpable, + BS, no hepatosplenomegaly  Back: w/ catheter draining blood, non tender  Extremities: no c/c/e  Neurological: AAOx3      MEDICATIONS  (STANDING):  amLODIPine   Tablet 10 milliGRAM(s) Oral daily  atorvastatin 10 milliGRAM(s) Oral at bedtime  ceFAZolin   IVPB 2000 milliGRAM(s) IV Intermittent every 8 hours  hydrochlorothiazide 12.5 milliGRAM(s) Oral daily  losartan 50 milliGRAM(s) Oral daily  polyethylene glycol 3350 17 Gram(s) Oral daily  senna 2 Tablet(s) Oral at bedtime  sodium chloride 0.9%. 1000 milliLiter(s) (75 mL/Hr) IV Continuous <Continuous>  tamsulosin 0.4 milliGRAM(s) Oral at bedtime    MEDICATIONS  (PRN):  acetaminophen     Tablet .. 650 milliGRAM(s) Oral every 6 hours PRN Mild Pain (1 - 3)  calcium carbonate    500 mG (Tums) Chewable 1 Tablet(s) Chew every 6 hours PRN Heartburn  ondansetron Injectable 4 milliGRAM(s) IV Push every 6 hours PRN Nausea and/or Vomiting  oxyCODONE    IR 5 milliGRAM(s) Oral every 6 hours PRN Moderate Pain (4 - 6)      Allergies    No Known Allergies    Intolerances        LABS:                        11.4   9.51  )-----------( 411      ( 25 May 2023 13:23 )             32.8     05-25    131<L>  |  94<L>  |  15  ----------------------------<  93  3.6   |  25  |  0.81    Ca    8.4      25 May 2023 07:13      PT/INR - ( 25 May 2023 07:15 )   PT: 12.8 sec;   INR: 1.10 ratio         PTT - ( 25 May 2023 07:15 )  PTT:25.9 sec      RADIOLOGY & ADDITIONAL TESTS:  Studies reviewed.

## 2023-05-25 NOTE — PROGRESS NOTE ADULT - ASSESSMENT
62M s/pL PCNL, post op hematuria, s/p IR L angiogram and embolization, replacement of NT; right groin access pseudoaneurysm s/p Balloon-assisted thrombin injection of pseudoaneurysm on 5/22     Plan:  - Continue NT, monitor drainage   - NPO for possible IR intervention today   - monitor urine output and color   - am labs reviewed, downtrending H/H, will repeat at noon today   - cont ancef  - f/u groin US   - f/u heme

## 2023-05-25 NOTE — PROGRESS NOTE ADULT - SUBJECTIVE AND OBJECTIVE BOX
UROLOGY PA PROGRESS NOTE:     Subjective:  no acute events overnight    Objective:  Vital signs  T(F): , Max: 98.6 (05-24-23 @ 13:15)  HR: 71 (05-25-23 @ 09:57)  BP: 115/70 (05-25-23 @ 09:57)  SpO2: 95% (05-25-23 @ 09:57)  Wt(kg): --    Output     05-24 @ 07:01  -  05-25 @ 07:00  --------------------------------------------------------  IN: 2220 mL / OUT: 2425 mL / NET: -205 mL    05-25 @ 07:01  -  05-25 @ 12:01  --------------------------------------------------------  IN: 0 mL / OUT: 500 mL / NET: -500 mL  voided 725cc  NT 50    Physical Exam:  Gen: NAD  Abd: soft, NT/ND, left flank with NT draining into pouch blood;  MSK: right groin swelling with ecchymosis   : voiding pink urine     Labs:  05-25  9.35  / 30.7  /0.81   05-24  x     / x     /0.78                           11.0   9.35  )-----------( 405      ( 25 May 2023 07:13 )             30.7     05-25    131<L>  |  94<L>  |  15  ----------------------------<  93  3.6   |  25  |  0.81    Ca    8.4      25 May 2023 07:13      PT/INR - ( 25 May 2023 07:15 )   PT: 12.8 sec;   INR: 1.10 ratio         PTT - ( 25 May 2023 07:15 )  PTT:25.9 sec           UROLOGY PA PROGRESS NOTE:     Subjective:  no acute events overnight    Objective:  Vital signs  T(F): , Max: 98.6 (05-24-23 @ 13:15)  HR: 71 (05-25-23 @ 09:57)  BP: 115/70 (05-25-23 @ 09:57)  SpO2: 95% (05-25-23 @ 09:57)  Wt(kg): --    Output     05-24 @ 07:01  -  05-25 @ 07:00  --------------------------------------------------------  IN: 2220 mL / OUT: 2425 mL / NET: -205 mL    05-25 @ 07:01  -  05-25 @ 12:01  --------------------------------------------------------  IN: 0 mL / OUT: 500 mL / NET: -500 mL  voided 725cc  NT 50    Physical Exam:  Gen: NAD  Abd: soft, NT/ND, left flank with NT draining into pouch bloody;  MSK: right groin swelling with ecchymosis   : voiding pink urine     Labs:  05-25  9.35  / 30.7  /0.81   05-24  x     / x     /0.78                           11.0   9.35  )-----------( 405      ( 25 May 2023 07:13 )             30.7     05-25    131<L>  |  94<L>  |  15  ----------------------------<  93  3.6   |  25  |  0.81    Ca    8.4      25 May 2023 07:13      PT/INR - ( 25 May 2023 07:15 )   PT: 12.8 sec;   INR: 1.10 ratio         PTT - ( 25 May 2023 07:15 )  PTT:25.9 sec

## 2023-05-26 LAB
ANION GAP SERPL CALC-SCNC: 17 MMOL/L — SIGNIFICANT CHANGE UP (ref 5–17)
APTT BLD: 25.9 SEC — LOW (ref 27.5–35.5)
BUN SERPL-MCNC: 18 MG/DL — SIGNIFICANT CHANGE UP (ref 7–23)
CALCIUM SERPL-MCNC: 8.6 MG/DL — SIGNIFICANT CHANGE UP (ref 8.4–10.5)
CHLORIDE SERPL-SCNC: 94 MMOL/L — LOW (ref 96–108)
CO2 SERPL-SCNC: 24 MMOL/L — SIGNIFICANT CHANGE UP (ref 22–31)
CREAT SERPL-MCNC: 0.89 MG/DL — SIGNIFICANT CHANGE UP (ref 0.5–1.3)
EGFR: 96 ML/MIN/1.73M2 — SIGNIFICANT CHANGE UP
GLUCOSE SERPL-MCNC: 80 MG/DL — SIGNIFICANT CHANGE UP (ref 70–99)
HCT VFR BLD CALC: 33.9 % — LOW (ref 39–50)
HGB BLD-MCNC: 11.5 G/DL — LOW (ref 13–17)
INR BLD: 1.09 RATIO — SIGNIFICANT CHANGE UP (ref 0.88–1.16)
MCHC RBC-ENTMCNC: 31.1 PG — SIGNIFICANT CHANGE UP (ref 27–34)
MCHC RBC-ENTMCNC: 33.9 GM/DL — SIGNIFICANT CHANGE UP (ref 32–36)
MCV RBC AUTO: 91.6 FL — SIGNIFICANT CHANGE UP (ref 80–100)
NRBC # BLD: 0 /100 WBCS — SIGNIFICANT CHANGE UP (ref 0–0)
PLATELET # BLD AUTO: 436 K/UL — HIGH (ref 150–400)
POTASSIUM SERPL-MCNC: 3.9 MMOL/L — SIGNIFICANT CHANGE UP (ref 3.5–5.3)
POTASSIUM SERPL-SCNC: 3.9 MMOL/L — SIGNIFICANT CHANGE UP (ref 3.5–5.3)
PROTHROM AB SERPL-ACNC: 12.5 SEC — SIGNIFICANT CHANGE UP (ref 10.5–13.4)
RBC # BLD: 3.7 M/UL — LOW (ref 4.2–5.8)
RBC # FLD: 11.9 % — SIGNIFICANT CHANGE UP (ref 10.3–14.5)
SODIUM SERPL-SCNC: 135 MMOL/L — SIGNIFICANT CHANGE UP (ref 135–145)
WBC # BLD: 8.25 K/UL — SIGNIFICANT CHANGE UP (ref 3.8–10.5)
WBC # FLD AUTO: 8.25 K/UL — SIGNIFICANT CHANGE UP (ref 3.8–10.5)

## 2023-05-26 RX ORDER — ONDANSETRON 8 MG/1
8 TABLET, FILM COATED ORAL ONCE
Refills: 0 | Status: DISCONTINUED | OUTPATIENT
Start: 2023-05-26 | End: 2023-05-27

## 2023-05-26 RX ORDER — AMLODIPINE BESYLATE 2.5 MG/1
10 TABLET ORAL DAILY
Refills: 0 | Status: DISCONTINUED | OUTPATIENT
Start: 2023-05-26 | End: 2023-05-27

## 2023-05-26 RX ORDER — FENTANYL CITRATE 50 UG/ML
25 INJECTION INTRAVENOUS
Refills: 0 | Status: DISCONTINUED | OUTPATIENT
Start: 2023-05-26 | End: 2023-05-27

## 2023-05-26 RX ORDER — HYDROMORPHONE HYDROCHLORIDE 2 MG/ML
0.5 INJECTION INTRAMUSCULAR; INTRAVENOUS; SUBCUTANEOUS ONCE
Refills: 0 | Status: DISCONTINUED | OUTPATIENT
Start: 2023-05-26 | End: 2023-05-26

## 2023-05-26 RX ORDER — HEPARIN SODIUM 5000 [USP'U]/ML
5000 INJECTION INTRAVENOUS; SUBCUTANEOUS EVERY 8 HOURS
Refills: 0 | Status: DISCONTINUED | OUTPATIENT
Start: 2023-05-26 | End: 2023-05-27

## 2023-05-26 RX ORDER — LOSARTAN POTASSIUM 100 MG/1
50 TABLET, FILM COATED ORAL DAILY
Refills: 0 | Status: DISCONTINUED | OUTPATIENT
Start: 2023-05-26 | End: 2023-05-27

## 2023-05-26 RX ORDER — SODIUM CHLORIDE 9 MG/ML
1000 INJECTION, SOLUTION INTRAVENOUS
Refills: 0 | Status: DISCONTINUED | OUTPATIENT
Start: 2023-05-26 | End: 2023-05-27

## 2023-05-26 RX ADMIN — TRANEXAMIC ACID 213 MILLIGRAM(S): 100 INJECTION, SOLUTION INTRAVENOUS at 06:22

## 2023-05-26 RX ADMIN — SODIUM CHLORIDE 75 MILLILITER(S): 9 INJECTION, SOLUTION INTRAVENOUS at 19:45

## 2023-05-26 RX ADMIN — TRANEXAMIC ACID 213 MILLIGRAM(S): 100 INJECTION, SOLUTION INTRAVENOUS at 17:58

## 2023-05-26 RX ADMIN — Medication 100 MILLIGRAM(S): at 22:15

## 2023-05-26 RX ADMIN — Medication 100 MILLIGRAM(S): at 17:57

## 2023-05-26 RX ADMIN — ATORVASTATIN CALCIUM 10 MILLIGRAM(S): 80 TABLET, FILM COATED ORAL at 21:48

## 2023-05-26 RX ADMIN — ONDANSETRON 4 MILLIGRAM(S): 8 TABLET, FILM COATED ORAL at 21:51

## 2023-05-26 RX ADMIN — OXYCODONE HYDROCHLORIDE 5 MILLIGRAM(S): 5 TABLET ORAL at 21:46

## 2023-05-26 RX ADMIN — HYDROMORPHONE HYDROCHLORIDE 0.5 MILLIGRAM(S): 2 INJECTION INTRAMUSCULAR; INTRAVENOUS; SUBCUTANEOUS at 16:24

## 2023-05-26 RX ADMIN — HEPARIN SODIUM 5000 UNIT(S): 5000 INJECTION INTRAVENOUS; SUBCUTANEOUS at 21:48

## 2023-05-26 RX ADMIN — Medication 650 MILLIGRAM(S): at 20:45

## 2023-05-26 RX ADMIN — Medication 650 MILLIGRAM(S): at 19:45

## 2023-05-26 RX ADMIN — OXYCODONE HYDROCHLORIDE 5 MILLIGRAM(S): 5 TABLET ORAL at 22:46

## 2023-05-26 RX ADMIN — Medication 100 MILLIGRAM(S): at 05:20

## 2023-05-26 RX ADMIN — TAMSULOSIN HYDROCHLORIDE 0.4 MILLIGRAM(S): 0.4 CAPSULE ORAL at 21:48

## 2023-05-26 RX ADMIN — SENNA PLUS 2 TABLET(S): 8.6 TABLET ORAL at 21:48

## 2023-05-26 NOTE — PROCEDURE NOTE - GENERAL PROCEDURE NAME
Renal angiogram and embolization
Balloon-assisted thrombin injection of pseudoaneurysm
Left renal angiogram and embolization

## 2023-05-26 NOTE — PROGRESS NOTE ADULT - SUBJECTIVE AND OBJECTIVE BOX
Subjective  no overnight events   feeling well   Objective    Vital signs  T(F): , Max: 98.8 (05-25-23 @ 15:56)  HR: 76 (05-26-23 @ 04:37)  BP: 110/72 (05-26-23 @ 04:37)  SpO2: 99% (05-26-23 @ 04:37)  Wt(kg): --    Output     05-25 @ 07:01  -  05-26 @ 07:00  --------------------------------------------------------  IN: 2100 mL / OUT: 2075 mL / NET: 25 mL        Gen awake alert nad axox3  Abd flat soft ntnd   Back pouch with tube thin dark wine urine    nonpalp bladder  right groin with hematoma   palp lower ext pulses     Labs      05-25 @ 13:23    WBC 9.51  / Hct 32.8  / SCr --       05-25 @ 07:13    WBC 9.35  / Hct 30.7  / SCr 0.81       Urine Cx: Culture - Urine (05.15.23 @ 14:36)    Specimen Source: Kidney Kidney   Culture Results:   No growth      Blood Cx: Culture - Other (05.15.23 @ 14:36)    Specimen Source: .Other Other   Culture Results:   No growth at 48 hours        Imaging  < from: US Duplex Arterial Lower Ext Ltd, Right (05.25.23 @ 09:04) >    IMPRESSION:  Thrombosed right groin pseudoaneurysm.    Two right groin hematomas.    < end of copied text >

## 2023-05-26 NOTE — PRE-ANESTHESIA EVALUATION ADULT - NSANTHSNORERD_ENT_A_CORE
Problem: Safety - Adult  Goal: Free from fall injury  Outcome: Progressing     Problem: ABCDS Injury Assessment  Goal: Absence of physical injury  Outcome: Progressing     Problem: Respiratory - Adult  Goal: Achieves optimal ventilation and oxygenation  1/18/2023 0215 by Jeniffer Parker RN  Outcome: Progressing  1/17/2023 2135 by Rebekah Mayes RN  Outcome: Progressing  Flowsheets (Taken 1/17/2023 1230)  Achieves optimal ventilation and oxygenation:   Assess for changes in respiratory status   Assess for changes in mentation and behavior   Position to facilitate oxygenation and minimize respiratory effort   Oxygen supplementation based on oxygen saturation or arterial blood gases     Problem: Cardiovascular - Adult  Goal: Maintains optimal cardiac output and hemodynamic stability  1/18/2023 0215 by Jeniffer Parker RN  Outcome: Progressing  1/17/2023 2135 by Rebekah Mayes RN  Outcome: Progressing  Flowsheets (Taken 1/17/2023 1230)  Maintains optimal cardiac output and hemodynamic stability:   Monitor blood pressure and heart rate   Monitor urine output and notify Licensed Independent Practitioner for values outside of normal range   Assess for signs of decreased cardiac output  Goal: Absence of cardiac dysrhythmias or at baseline  Outcome: Progressing
Yes

## 2023-05-26 NOTE — PROGRESS NOTE ADULT - ASSESSMENT
62M s/pL PCNL, post op hematuria, s/p IR L angiogram and embolization, replacement of NT; right groin access pseudoaneurysm s/p Balloon-assisted thrombin injection of pseudoaneurysm on 5/22; s/p TXA   Plan:  - Continue NT, monitor drainage   - NPO for IR embo today  - monitor urine output and color   - am labs   - cont ancef

## 2023-05-26 NOTE — PROCEDURE NOTE - PLAN
TR Band can be removed per protocol.  Bedrest/right lower extremity straight x 1 hour. 30 degree bedrest x 1 hour.   Nephrostomy to gravity.
- Obtain f/u R groin US in 48 hours.
- trend PCN output.  - trend CBC.

## 2023-05-26 NOTE — PROCEDURE NOTE - PROCEDURE FINDINGS AND DETAILS
Successful balloon-assisted thrombin injection of pseudoaneurysm of the R CFA.
L CFA access. L renal angiogram w/ several small pseudoaneurysms. Coil embolization was performed of 4 distal branches. L groin closed w/ Celt.
Left radial access renal angiogram demonstrating irregular vessel at suspected site of bleed. TR Band placed.  Right femoral access renal angiogram with subselective gelfoam embolization. Angioseal.  Removal of indwelling nephrostomy tube/stent with replacement of 14Fr Pickering Hancock nephrostomy tube with pigtail in collecting system.

## 2023-05-26 NOTE — PROGRESS NOTE ADULT - SUBJECTIVE AND OBJECTIVE BOX
Post Procedure Check    Pt seen and examined without complaints. Denies SOB/CP/N/V.     Vital Signs Last 24 Hrs  T(C): 36.3 (26 May 2023 17:15), Max: 37.1 (26 May 2023 01:01)  T(F): 97.4 (26 May 2023 17:15), Max: 98.8 (26 May 2023 01:01)  HR: 85 (26 May 2023 17:15) (63 - 88)  BP: 148/82 (26 May 2023 17:15) (110/72 - 161/93)  BP(mean): 91 (26 May 2023 13:13) (91 - 91)  RR: 18 (26 May 2023 17:15) (14 - 18)  SpO2: 100% (26 May 2023 17:15) (95% - 100%)    Parameters below as of 26 May 2023 17:15  Patient On (Oxygen Delivery Method): room air        I&O's Summary    25 May 2023 07:01  -  26 May 2023 07:00  --------------------------------------------------------  IN: 2100 mL / OUT: 2075 mL / NET: 25 mL    26 May 2023 07:01  -  26 May 2023 19:05  --------------------------------------------------------  IN: 0 mL / OUT: 300 mL / NET: -300 mL        Physical Exam  Gen: NAD  Abd: Soft, NT, ND  Back: L PCN in place and pouched, + hematuric output   : wnl  L groin: no hematoma noted. dressing in place, C/D/I                          11.5   8.25  )-----------( 436      ( 26 May 2023 07:49 )             33.9       05-26    135  |  94<L>  |  18  ----------------------------<  80  3.9   |  24  |  0.89    Ca    8.6      26 May 2023 07:49        A/P: 62y Male s/p left renal angiogram and embolization by IR    -Monitor hematuria  -AM labs

## 2023-05-27 LAB
ANION GAP SERPL CALC-SCNC: 18 MMOL/L — HIGH (ref 5–17)
BUN SERPL-MCNC: 13 MG/DL — SIGNIFICANT CHANGE UP (ref 7–23)
CALCIUM SERPL-MCNC: 8 MG/DL — LOW (ref 8.4–10.5)
CHLORIDE SERPL-SCNC: 93 MMOL/L — LOW (ref 96–108)
CO2 SERPL-SCNC: 19 MMOL/L — LOW (ref 22–31)
CREAT SERPL-MCNC: 0.68 MG/DL — SIGNIFICANT CHANGE UP (ref 0.5–1.3)
EGFR: 104 ML/MIN/1.73M2 — SIGNIFICANT CHANGE UP
GLUCOSE SERPL-MCNC: 87 MG/DL — SIGNIFICANT CHANGE UP (ref 70–99)
HCT VFR BLD CALC: 31.6 % — LOW (ref 39–50)
HCT VFR BLD CALC: 33.9 % — LOW (ref 39–50)
HGB BLD-MCNC: 11 G/DL — LOW (ref 13–17)
HGB BLD-MCNC: 12.1 G/DL — LOW (ref 13–17)
MCHC RBC-ENTMCNC: 31.3 PG — SIGNIFICANT CHANGE UP (ref 27–34)
MCHC RBC-ENTMCNC: 31.5 PG — SIGNIFICANT CHANGE UP (ref 27–34)
MCHC RBC-ENTMCNC: 34.8 GM/DL — SIGNIFICANT CHANGE UP (ref 32–36)
MCHC RBC-ENTMCNC: 35.7 GM/DL — SIGNIFICANT CHANGE UP (ref 32–36)
MCV RBC AUTO: 87.8 FL — SIGNIFICANT CHANGE UP (ref 80–100)
MCV RBC AUTO: 90.5 FL — SIGNIFICANT CHANGE UP (ref 80–100)
NRBC # BLD: 0 /100 WBCS — SIGNIFICANT CHANGE UP (ref 0–0)
NRBC # BLD: 0 /100 WBCS — SIGNIFICANT CHANGE UP (ref 0–0)
PLATELET # BLD AUTO: 440 K/UL — HIGH (ref 150–400)
PLATELET # BLD AUTO: 481 K/UL — HIGH (ref 150–400)
POTASSIUM SERPL-MCNC: 4 MMOL/L — SIGNIFICANT CHANGE UP (ref 3.5–5.3)
POTASSIUM SERPL-SCNC: 4 MMOL/L — SIGNIFICANT CHANGE UP (ref 3.5–5.3)
RBC # BLD: 3.49 M/UL — LOW (ref 4.2–5.8)
RBC # BLD: 3.86 M/UL — LOW (ref 4.2–5.8)
RBC # FLD: 11.6 % — SIGNIFICANT CHANGE UP (ref 10.3–14.5)
RBC # FLD: 11.6 % — SIGNIFICANT CHANGE UP (ref 10.3–14.5)
SODIUM SERPL-SCNC: 130 MMOL/L — LOW (ref 135–145)
WBC # BLD: 11.85 K/UL — HIGH (ref 3.8–10.5)
WBC # BLD: 15.23 K/UL — HIGH (ref 3.8–10.5)
WBC # FLD AUTO: 11.85 K/UL — HIGH (ref 3.8–10.5)
WBC # FLD AUTO: 15.23 K/UL — HIGH (ref 3.8–10.5)

## 2023-05-27 PROCEDURE — 85027 COMPLETE CBC AUTOMATED: CPT

## 2023-05-27 PROCEDURE — 86923 COMPATIBILITY TEST ELECTRIC: CPT

## 2023-05-27 PROCEDURE — 85730 THROMBOPLASTIN TIME PARTIAL: CPT

## 2023-05-27 PROCEDURE — C1725: CPT

## 2023-05-27 PROCEDURE — 85270 CLOT FACTOR XI PTA: CPT

## 2023-05-27 PROCEDURE — P9016: CPT

## 2023-05-27 PROCEDURE — 85291 CLOT FACTOR XIII FIBRIN SCRN: CPT

## 2023-05-27 PROCEDURE — 86901 BLOOD TYPING SEROLOGIC RH(D): CPT

## 2023-05-27 PROCEDURE — C1760: CPT

## 2023-05-27 PROCEDURE — C1758: CPT

## 2023-05-27 PROCEDURE — 85025 COMPLETE CBC W/AUTO DIFF WBC: CPT

## 2023-05-27 PROCEDURE — C1894: CPT

## 2023-05-27 PROCEDURE — 85610 PROTHROMBIN TIME: CPT

## 2023-05-27 PROCEDURE — 82962 GLUCOSE BLOOD TEST: CPT

## 2023-05-27 PROCEDURE — 76937 US GUIDE VASCULAR ACCESS: CPT

## 2023-05-27 PROCEDURE — 71045 X-RAY EXAM CHEST 1 VIEW: CPT

## 2023-05-27 PROCEDURE — 85245 CLOT FACTOR VIII VW RISTOCTN: CPT

## 2023-05-27 PROCEDURE — C1729: CPT

## 2023-05-27 PROCEDURE — 85240 CLOT FACTOR VIII AHG 1 STAGE: CPT

## 2023-05-27 PROCEDURE — 36002 PSEUDOANEURYSM INJECTION TRT: CPT

## 2023-05-27 PROCEDURE — 37244 VASC EMBOLIZE/OCCLUDE BLEED: CPT

## 2023-05-27 PROCEDURE — 76000 FLUOROSCOPY <1 HR PHYS/QHP: CPT

## 2023-05-27 PROCEDURE — 85246 CLOT FACTOR VIII VW ANTIGEN: CPT

## 2023-05-27 PROCEDURE — C1769: CPT

## 2023-05-27 PROCEDURE — C1726: CPT

## 2023-05-27 PROCEDURE — 86900 BLOOD TYPING SEROLOGIC ABO: CPT

## 2023-05-27 PROCEDURE — C1889: CPT

## 2023-05-27 PROCEDURE — 87086 URINE CULTURE/COLONY COUNT: CPT

## 2023-05-27 PROCEDURE — 74176 CT ABD & PELVIS W/O CONTRAST: CPT

## 2023-05-27 PROCEDURE — 80048 BASIC METABOLIC PNL TOTAL CA: CPT

## 2023-05-27 PROCEDURE — 85290 CLOT FACTOR XIII FIBRIN STAB: CPT

## 2023-05-27 PROCEDURE — 85247 CLOT FACTOR VIII MULTIMETRIC: CPT

## 2023-05-27 PROCEDURE — 36253 INS CATH REN ART 2ND+ UNILAT: CPT

## 2023-05-27 PROCEDURE — C2628: CPT

## 2023-05-27 PROCEDURE — 86850 RBC ANTIBODY SCREEN: CPT

## 2023-05-27 PROCEDURE — 88300 SURGICAL PATH GROSS: CPT

## 2023-05-27 PROCEDURE — C1887: CPT

## 2023-05-27 PROCEDURE — 82365 CALCULUS SPECTROSCOPY: CPT

## 2023-05-27 PROCEDURE — 93926 LOWER EXTREMITY STUDY: CPT

## 2023-05-27 PROCEDURE — 76942 ECHO GUIDE FOR BIOPSY: CPT

## 2023-05-27 PROCEDURE — 36430 TRANSFUSION BLD/BLD COMPNT: CPT

## 2023-05-27 PROCEDURE — 36415 COLL VENOUS BLD VENIPUNCTURE: CPT

## 2023-05-27 PROCEDURE — 85384 FIBRINOGEN ACTIVITY: CPT

## 2023-05-27 PROCEDURE — 87070 CULTURE OTHR SPECIMN AEROBIC: CPT

## 2023-05-27 RX ORDER — POLYETHYLENE GLYCOL 3350 17 G/17G
17 POWDER, FOR SOLUTION ORAL
Qty: 0 | Refills: 0 | DISCHARGE
Start: 2023-05-27

## 2023-05-27 RX ORDER — HYDROCHLOROTHIAZIDE 25 MG
1 TABLET ORAL
Qty: 0 | Refills: 0 | DISCHARGE
Start: 2023-05-27

## 2023-05-27 RX ORDER — OXYCODONE HYDROCHLORIDE 5 MG/1
1 TABLET ORAL
Qty: 8 | Refills: 0
Start: 2023-05-27 | End: 2023-05-28

## 2023-05-27 RX ORDER — LOSARTAN POTASSIUM 100 MG/1
1 TABLET, FILM COATED ORAL
Qty: 0 | Refills: 0 | DISCHARGE
Start: 2023-05-27

## 2023-05-27 RX ORDER — ATORVASTATIN CALCIUM 80 MG/1
1 TABLET, FILM COATED ORAL
Qty: 0 | Refills: 0 | DISCHARGE

## 2023-05-27 RX ORDER — HYDROMORPHONE HYDROCHLORIDE 2 MG/ML
0.2 INJECTION INTRAMUSCULAR; INTRAVENOUS; SUBCUTANEOUS ONCE
Refills: 0 | Status: DISCONTINUED | OUTPATIENT
Start: 2023-05-27 | End: 2023-05-27

## 2023-05-27 RX ORDER — ASPIRIN/CALCIUM CARB/MAGNESIUM 324 MG
81 TABLET ORAL DAILY
Refills: 0 | Status: DISCONTINUED | OUTPATIENT
Start: 2023-05-27 | End: 2023-05-27

## 2023-05-27 RX ADMIN — HYDROMORPHONE HYDROCHLORIDE 0.2 MILLIGRAM(S): 2 INJECTION INTRAMUSCULAR; INTRAVENOUS; SUBCUTANEOUS at 01:09

## 2023-05-27 RX ADMIN — Medication 100 MILLIGRAM(S): at 15:15

## 2023-05-27 RX ADMIN — Medication 650 MILLIGRAM(S): at 12:38

## 2023-05-27 RX ADMIN — OXYCODONE HYDROCHLORIDE 5 MILLIGRAM(S): 5 TABLET ORAL at 06:30

## 2023-05-27 RX ADMIN — AMLODIPINE BESYLATE 10 MILLIGRAM(S): 2.5 TABLET ORAL at 05:32

## 2023-05-27 RX ADMIN — OXYCODONE HYDROCHLORIDE 5 MILLIGRAM(S): 5 TABLET ORAL at 05:30

## 2023-05-27 RX ADMIN — HEPARIN SODIUM 5000 UNIT(S): 5000 INJECTION INTRAVENOUS; SUBCUTANEOUS at 05:33

## 2023-05-27 RX ADMIN — Medication 100 MILLIGRAM(S): at 05:33

## 2023-05-27 RX ADMIN — HYDROMORPHONE HYDROCHLORIDE 0.2 MILLIGRAM(S): 2 INJECTION INTRAMUSCULAR; INTRAVENOUS; SUBCUTANEOUS at 08:52

## 2023-05-27 RX ADMIN — HYDROMORPHONE HYDROCHLORIDE 0.2 MILLIGRAM(S): 2 INJECTION INTRAMUSCULAR; INTRAVENOUS; SUBCUTANEOUS at 08:37

## 2023-05-27 RX ADMIN — FENTANYL CITRATE 25 MICROGRAM(S): 50 INJECTION INTRAVENOUS at 03:51

## 2023-05-27 RX ADMIN — HEPARIN SODIUM 5000 UNIT(S): 5000 INJECTION INTRAVENOUS; SUBCUTANEOUS at 15:15

## 2023-05-27 RX ADMIN — FENTANYL CITRATE 25 MICROGRAM(S): 50 INJECTION INTRAVENOUS at 03:21

## 2023-05-27 RX ADMIN — HYDROMORPHONE HYDROCHLORIDE 0.2 MILLIGRAM(S): 2 INJECTION INTRAMUSCULAR; INTRAVENOUS; SUBCUTANEOUS at 01:39

## 2023-05-27 RX ADMIN — LOSARTAN POTASSIUM 50 MILLIGRAM(S): 100 TABLET, FILM COATED ORAL at 06:42

## 2023-05-27 RX ADMIN — ONDANSETRON 4 MILLIGRAM(S): 8 TABLET, FILM COATED ORAL at 05:30

## 2023-05-27 RX ADMIN — Medication 81 MILLIGRAM(S): at 12:09

## 2023-05-27 RX ADMIN — POLYETHYLENE GLYCOL 3350 17 GRAM(S): 17 POWDER, FOR SOLUTION ORAL at 12:08

## 2023-05-27 RX ADMIN — Medication 650 MILLIGRAM(S): at 12:08

## 2023-05-27 NOTE — PROGRESS NOTE ADULT - PROVIDER SPECIALTY LIST ADULT
Intervent Radiology
Urology
Heme/Onc
Heme/Onc
Intervent Radiology
Urology
Intervent Radiology
Intervent Radiology
Urology

## 2023-05-27 NOTE — CHART NOTE - NSCHARTNOTEFT_GEN_A_CORE
Case and imaging reviewed with Dr. Mcguire. Patient is currently hemodynamically stable. Will plan for left renal angio/embo tomorrow 5/26.     -Please keep patient NPO after midnight tn  -STAT am labs  -Please place IR procedure order under Dr. Mcguire.   -Continue medical management.   -If patient has hemodynamically significant bleeding despite adequate medical management, Please contact IR for urgent angio/embo. In this scenario, ICU consult is required.   -Above d/w primary team
Urine NT tube still bloody, heme called for f/u analysis of blood work which was recommended. vWF antigen and activity, factor 8, fibrinogen are elevated, factor 11 normal. This is an appropriate response to a bleed, factor 13 and vWF multimer still pending. Heme will continue to follow.
 Post op Check    Pt seen and examined without complaints. Pain is controlled. Denies SOB/CP/N/V.     Vital Signs Last 24 Hrs  T(C): 36.6 (22 May 2023 18:20), Max: 37.2 (21 May 2023 21:41)  T(F): 97.9 (22 May 2023 18:20), Max: 99 (21 May 2023 21:41)  HR: 68 (22 May 2023 19:20) (67 - 80)  BP: 117/78 (22 May 2023 19:20) (104/73 - 162/84)  BP(mean): 91 (22 May 2023 19:20) (80 - 91)  RR: 12 (22 May 2023 19:20) (12 - 18)  SpO2: 97% (22 May 2023 19:20) (94% - 98%)    Parameters below as of 22 May 2023 19:20  Patient On (Oxygen Delivery Method): room air        I&O's Summary    21 May 2023 07:01  -  22 May 2023 07:00  --------------------------------------------------------  IN: 1930 mL / OUT: 2500 mL / NET: -570 mL    22 May 2023 07:01  -  22 May 2023 19:56  --------------------------------------------------------  IN: 50 mL / OUT: 1150 mL / NET: -1100 mL        Physical Exam  Gen: NAD, A&Ox3  Pulm: No respiratory distress, no subcostal retractions  Abd: Soft, NT, ND  Back: L flank PCN in place draining translucent red urine into pouch  : nonpalp bladder. R groin with small palp hematoma, minor ecchymosis. L groin access site with dressing overlying, c/d/i. no hematoma or ecchymosis appreciated                          11.7   8.02  )-----------( 314      ( 22 May 2023 06:35 )             35.2       05-22    137  |  99  |  18  ----------------------------<  99  4.2   |  26  |  0.82    Ca    8.7      22 May 2023 06:33        A/P: 62y Male s/p Successful balloon-assisted thrombin injection of pseudoaneurysm of the R CFA  - Bedrest with leg straight until 10PM  - Repeat R groin US in 48 hours to assess area per IR recommendations  - DVT prophylaxis/OOB  - Incentive spirometry  - Strict I&O's  - Analgesia and antiemetics as needed  - Regular Diet  - AM labs  - Tube plan
Subjective: Notified by RN pt c/o right groin discomfort with movement, noted to have hematoma on exam. Patient seen and examined at bedside. Pt able to move extremities. No other complaints.     Objective:  Vital signs  T(F): , Max: 98.7 (05-19-23 @ 00:20)  HR: 75 (05-19-23 @ 19:56)  BP: 118/71 (05-19-23 @ 19:56)  SpO2: 96% (05-19-23 @ 19:56)  Wt(kg): --    I&O's Summary    18 May 2023 07:01  -  19 May 2023 07:00  --------------------------------------------------------  IN: 875 mL / OUT: 1015 mL / NET: -140 mL    19 May 2023 07:01  -  19 May 2023 22:48  --------------------------------------------------------  IN: 50 mL / OUT: 1250 mL / NET: -1200 mL    Gen: NAD  Pulm: No respiratory distress, no subcostal retractions  CV: RRR, no JVD  Abd: Soft, NT, ND  : voided urine is clear yellow   Extremities:  R groin site dressing C/D/I, ~2x2 hematoma, soft and nonpulsatile, mild tenderness to palpation -hematoma demarcated for monitoring   R femoral pulse 2+, No pedal edema or calf tenderness noted.    A/P: 62y Male s/pL PCNL, post op hematuria, s/p IR L angiogram and embolization, replacement of NT now with hematoma of right groin angiogram site     -IR made aware, will continue to monitor and obtain ultrasound if any concern for expanding hematoma   -Site demarcated and redressed   -Pt otherwise asymptomatic
nephrostomy tube removed in entirety at the bedside by Dr. Bermudez without events.  clean dressing applied over site

## 2023-05-27 NOTE — PROGRESS NOTE ADULT - SUBJECTIVE AND OBJECTIVE BOX
STATUS POST:  left renal angiogram and embolization    POST OPERATIVE DAY #: 1    SUBJECTIVE:   Seen at bedside, son at bedside  Flank pain controlled with medications    ---------------------------------------------------------------    Vital Signs Last 24 Hrs  T(C): 36.7 (27 May 2023 09:57), Max: 37 (27 May 2023 04:11)  T(F): 98.1 (27 May 2023 09:57), Max: 98.6 (27 May 2023 04:11)  HR: 90 (27 May 2023 09:57) (63 - 90)  BP: 132/79 (27 May 2023 09:57) (132/79 - 173/92)  BP(mean): 91 (26 May 2023 13:13) (91 - 91)  RR: 18 (27 May 2023 09:57) (14 - 18)  SpO2: 99% (27 May 2023 09:57) (98% - 100%)    Parameters below as of 27 May 2023 09:57  Patient On (Oxygen Delivery Method): room air        I&O's Detail    26 May 2023 07:01  -  27 May 2023 07:00  --------------------------------------------------------  IN:    IV PiggyBack: 100 mL    Lactated Ringers: 900 mL  Total IN: 1000 mL    OUT:    Nephrostomy Tube (mL): 350 mL    Oral Fluid: 0 mL    Voided (mL): 2125 mL  Total OUT: 2475 mL    Total NET: -1475 mL      27 May 2023 07:01  -  27 May 2023 13:00  --------------------------------------------------------  IN:    Oral Fluid: 200 mL  Total IN: 200 mL    OUT:    Voided (mL): 1525 mL  Total OUT: 1525 mL    Total NET: -1325 mL          ----------------------------------------------------------------    Physical Exam:  General: NAD, Comfortable in bed     Neuro: A&Ox3  Respiratory: nonlabored breathing, no respiratory distress    Abd: left nephrostomy in place with cranberry color urine  left groin soft, c/d/i, no hematoma      -------------------------------------------------------------------    LABS:                        11.0   11.85 )-----------( 440      ( 27 May 2023 06:49 )             31.6     05-27    130<L>  |  93<L>  |  13  ----------------------------<  87  4.0   |  19<L>  |  0.68    Ca    8.0<L>      27 May 2023 06:49      PT/INR - ( 26 May 2023 07:49 )   PT: 12.5 sec;   INR: 1.09 ratio         PTT - ( 26 May 2023 07:49 )  PTT:25.9 sec        RADIOLOGY & ADDITIONAL STUDIES:      ASSESMENT/PLAN:  62y Male s/p L PCNL 5/15 c/b pseudoaneurysm of left kidney s/p IR embolization 5/18 c/b R groin hematoma s/p embolization 5/22 with continued gross hematuria now s/p repeat left renal angiogram and embolization by IR 5/26    no evidence of access site complication in left groin  urine clearing compared to prior, no evidence of further bleeding    nephrostomy subsequently removed by  team after examined patient  further management per  team      Jason Magaña MD  Interventional Radiology PGY5  - Nonemergent consults:  [facility] order "Consult- Interventional Radiology"  - Emergent issues (pager): [facility] [phone]; [facility] [phone]; [phone]  - Scheduling questions: [facility] [phone]; [facility] [phone]  - Clinic/outpatient booking: [facility] [phone]; [facility] [phone]

## 2023-05-27 NOTE — PROGRESS NOTE ADULT - ASSESSMENT
A/P: 62y Male s/p L PCNL 5/15 pseudoaneurysm of left kidney s/p IR embolization 5/18 c/b R groin hematoma s/p embolization 5/22 with continued gross hematuria now s/p repeat left renal angiogram and embolization by IR 5/26    - CBC, BMP  - Ancef  - monitor hematuria  - f/u heme/onc -> ?trial of  TID x 3days  - ?tube plan A/P: 62y Male s/p L PCNL 5/15 c/b pseudoaneurysm of left kidney s/p IR embolization 5/18 c/b R groin hematoma s/p embolization 5/22 with continued gross hematuria now s/p repeat left renal angiogram and embolization by IR 5/26    - CBC, BMP  - Ancef  - monitor hematuria  - f/u heme/onc -> ?trial of  TID x 3days  - ?tube plan

## 2023-05-27 NOTE — PROGRESS NOTE ADULT - SUBJECTIVE AND OBJECTIVE BOX
Subjective    Objective    Vital signs  T(F): , Max: 98.6 (05-27-23 @ 04:11)  HR: 85 (05-27-23 @ 04:11)  BP: 161/81 (05-27-23 @ 04:11)  SpO2: 99% (05-27-23 @ 04:11)  Wt(kg): --    Output     OUT:    Nephrostomy Tube (mL): 350 mL    Voided (mL): 2125 mL  Total OUT: 2475 mL    Total NET: -2475 mL          Gen  Abd      Labs                            11.0   11.85 )-----------( 440      ( 27 May 2023 06:49 )             31.6     BMP pending         Subjective: Pt seen and examined. Having L flank pain. controlled with pain meds. still voiding.  Welsh  used    Objective    Vital signs  T(F): , Max: 98.6 (05-27-23 @ 04:11)  HR: 85 (05-27-23 @ 04:11)  BP: 161/81 (05-27-23 @ 04:11)  SpO2: 99% (05-27-23 @ 04:11)  Wt(kg): --    Output     OUT:    Nephrostomy Tube (mL): 350 mL    Voided (mL): 2125 mL  Total OUT: 2475 mL    Total NET: -2475 mL          Gen: NAD  Resp: no resp distress  Abd: s/nt/nd. L PCN in place with cranberry urine in bag  : no suprapubic tenderness. voided urine light pink    Labs                            11.0   11.85 )-----------( 440      ( 27 May 2023 06:49 )             31.6     05-27    130<L>  |  93<L>  |  13  ----------------------------<  87  4.0   |  19<L>  |  0.68    Ca    8.0<L>      27 May 2023 06:49

## 2023-05-27 NOTE — CHART NOTE - NSCHARTNOTESELECT_GEN_ALL_CORE
Hematoma/Event Note
Nephrostomy Tube Removal
Urology
Heme onc recs/Event Note
IR
No pertinent family history in first degree relatives

## 2023-05-28 VITALS
RESPIRATION RATE: 18 BRPM | OXYGEN SATURATION: 95 % | SYSTOLIC BLOOD PRESSURE: 135 MMHG | TEMPERATURE: 98 F | HEART RATE: 67 BPM | DIASTOLIC BLOOD PRESSURE: 58 MMHG

## 2023-05-31 ENCOUNTER — APPOINTMENT (OUTPATIENT)
Dept: UROLOGY | Facility: CLINIC | Age: 63
End: 2023-05-31
Payer: MEDICAID

## 2023-05-31 PROCEDURE — 99214 OFFICE O/P EST MOD 30 MIN: CPT | Mod: 24,25

## 2023-05-31 NOTE — PHYSICAL EXAM
[General Appearance - Well Developed] : well developed [General Appearance - Well Nourished] : well nourished [Normal Appearance] : normal appearance [Well Groomed] : well groomed [General Appearance - In No Acute Distress] : no acute distress [Abdomen Soft] : soft [Abdomen Tenderness] : non-tender [Costovertebral Angle Tenderness] : no ~M costovertebral angle tenderness [FreeTextEntry1] : left pcnl site healing well [Urinary Bladder Findings] : the bladder was normal on palpation [Edema] : no peripheral edema [] : no respiratory distress [Respiration, Rhythm And Depth] : normal respiratory rhythm and effort [Exaggerated Use Of Accessory Muscles For Inspiration] : no accessory muscle use [Oriented To Time, Place, And Person] : oriented to person, place, and time [Affect] : the affect was normal [Mood] : the mood was normal [Not Anxious] : not anxious [Normal Station and Gait] : the gait and station were normal for the patient's age [No Focal Deficits] : no focal deficits

## 2023-05-31 NOTE — ASSESSMENT
[FreeTextEntry1] : Diet modification reviewed at length- increasing fluids (primarily water), citrus is good, and decreasing/moderating salt, animal flesh protein, oxalate containing foods, and moderation of calcium intake (1000 mg/day is USRDA).\par \par I reviewed with the patient the risks of metabolic stone disease given their underlying risk parameters (all of which include large stones, multiple stones, bilateral stones, family history, and young age), and the indications for 24 hour urine metabolic assessment.\par \par DASH diet a good overall diet plan to consider and review for general health and stone health.\par \par We also discussed benefits of regular exercise and weight loss as independent risk reducers for stones.\par \par 1. Diet modification as discussed\par 2. 24 hour urine collection x 2 in the next few weeks\par 3. RTC with renal US in 8 to 10 weeks\par

## 2023-05-31 NOTE — HISTORY OF PRESENT ILLNESS
[FreeTextEntry1] : Pt returns for metabolic evaluation and prevention of future stone disease following recent surgery for stone.  At issue today is review of the stone analysis, risk factors for future stone episodes and establishing whether they have pure dietary, metabolic, or mixed causes for their stone risks which is unrelated to the surgical management of their stone disease.\par \par They underwent left PCNL on 5/15/23---> had bleeding, recognized with IR angio at time of tube removal. Developed pseudoaneurysm at groin, subsequently injected, and re-bled after initial gelfoam embo, requiring repeat embolization. Now home, post discharge, all tubes out.\par \par Stone analysis: 100% Calcium oxalate monohydrate\par \par \par Back now well healed, urine clear now, and no flank or abdominal pain. [None] : no symptoms

## 2023-06-13 LAB — VWF CBA/VWF AG PPP IA-RTO: SIGNIFICANT CHANGE UP

## 2023-07-22 ENCOUNTER — OUTPATIENT (OUTPATIENT)
Dept: OUTPATIENT SERVICES | Facility: HOSPITAL | Age: 63
LOS: 1 days | Discharge: HOME | End: 2023-07-22

## 2023-07-22 DIAGNOSIS — R79.9 ABNORMAL FINDING OF BLOOD CHEMISTRY, UNSPECIFIED: ICD-10-CM

## 2023-07-22 DIAGNOSIS — Z90.3 ACQUIRED ABSENCE OF STOMACH [PART OF]: Chronic | ICD-10-CM

## 2023-07-22 DIAGNOSIS — Z98.890 OTHER SPECIFIED POSTPROCEDURAL STATES: Chronic | ICD-10-CM

## 2023-07-22 DIAGNOSIS — Z90.49 ACQUIRED ABSENCE OF OTHER SPECIFIED PARTS OF DIGESTIVE TRACT: Chronic | ICD-10-CM

## 2023-07-24 ENCOUNTER — RESULT REVIEW (OUTPATIENT)
Age: 63
End: 2023-07-24

## 2023-07-24 ENCOUNTER — LABORATORY RESULT (OUTPATIENT)
Age: 63
End: 2023-07-24

## 2023-07-24 ENCOUNTER — APPOINTMENT (OUTPATIENT)
Dept: HEMATOLOGY ONCOLOGY | Facility: CLINIC | Age: 63
End: 2023-07-24
Payer: MEDICAID

## 2023-07-24 VITALS
HEIGHT: 66.85 IN | SYSTOLIC BLOOD PRESSURE: 148 MMHG | DIASTOLIC BLOOD PRESSURE: 97 MMHG | OXYGEN SATURATION: 96 % | WEIGHT: 139.77 LBS | HEART RATE: 70 BPM | TEMPERATURE: 99.1 F | RESPIRATION RATE: 18 BRPM | BODY MASS INDEX: 21.94 KG/M2

## 2023-07-24 DIAGNOSIS — R58 HEMORRHAGE, NOT ELSEWHERE CLASSIFIED: ICD-10-CM

## 2023-07-24 LAB
ALBUMIN SERPL ELPH-MCNC: 5.1 G/DL
ALP BLD-CCNC: 64 U/L
ALT SERPL-CCNC: 12 U/L
ANION GAP SERPL CALC-SCNC: 11 MMOL/L
APTT BLD: 28.7 SEC
AST SERPL-CCNC: 23 U/L
BASOPHILS # BLD AUTO: 0.03 K/UL — SIGNIFICANT CHANGE UP (ref 0–0.2)
BASOPHILS NFR BLD AUTO: 0.4 % — SIGNIFICANT CHANGE UP (ref 0–2)
BILIRUB SERPL-MCNC: 0.4 MG/DL
BUN SERPL-MCNC: 14 MG/DL
CALCIUM SERPL-MCNC: 9.4 MG/DL
CHLORIDE SERPL-SCNC: 99 MMOL/L
CO2 SERPL-SCNC: 29 MMOL/L
CREAT SERPL-MCNC: 0.79 MG/DL
EGFR: 100 ML/MIN/1.73M2
EOSINOPHIL # BLD AUTO: 0.21 K/UL — SIGNIFICANT CHANGE UP (ref 0–0.5)
EOSINOPHIL NFR BLD AUTO: 2.9 % — SIGNIFICANT CHANGE UP (ref 0–6)
GLUCOSE SERPL-MCNC: 104 MG/DL
HCT VFR BLD CALC: 43.4 % — SIGNIFICANT CHANGE UP (ref 39–50)
HGB BLD-MCNC: 14.4 G/DL — SIGNIFICANT CHANGE UP (ref 13–17)
IMM GRANULOCYTES NFR BLD AUTO: 0.4 % — SIGNIFICANT CHANGE UP (ref 0–0.9)
INR PPP: 0.93 RATIO
LYMPHOCYTES # BLD AUTO: 1.79 K/UL — SIGNIFICANT CHANGE UP (ref 1–3.3)
LYMPHOCYTES # BLD AUTO: 24.5 % — SIGNIFICANT CHANGE UP (ref 13–44)
MCHC RBC-ENTMCNC: 30.3 PG — SIGNIFICANT CHANGE UP (ref 27–34)
MCHC RBC-ENTMCNC: 33.2 G/DL — SIGNIFICANT CHANGE UP (ref 32–36)
MCV RBC AUTO: 91.2 FL — SIGNIFICANT CHANGE UP (ref 80–100)
MONOCYTES # BLD AUTO: 0.46 K/UL — SIGNIFICANT CHANGE UP (ref 0–0.9)
MONOCYTES NFR BLD AUTO: 6.3 % — SIGNIFICANT CHANGE UP (ref 2–14)
NEUTROPHILS # BLD AUTO: 4.78 K/UL — SIGNIFICANT CHANGE UP (ref 1.8–7.4)
NEUTROPHILS NFR BLD AUTO: 65.5 % — SIGNIFICANT CHANGE UP (ref 43–77)
NRBC # BLD: 0 /100 WBCS — SIGNIFICANT CHANGE UP (ref 0–0)
PLATELET # BLD AUTO: 298 K/UL — SIGNIFICANT CHANGE UP (ref 150–400)
POTASSIUM SERPL-SCNC: 4.4 MMOL/L
PROT SERPL-MCNC: 7.8 G/DL
PT BLD: 10.8 SEC
RBC # BLD: 4.76 M/UL — SIGNIFICANT CHANGE UP (ref 4.2–5.8)
RBC # FLD: 12.1 % — SIGNIFICANT CHANGE UP (ref 10.3–14.5)
SODIUM SERPL-SCNC: 139 MMOL/L
WBC # BLD: 7.3 K/UL — SIGNIFICANT CHANGE UP (ref 3.8–10.5)
WBC # FLD AUTO: 7.3 K/UL — SIGNIFICANT CHANGE UP (ref 3.8–10.5)

## 2023-07-24 PROCEDURE — 99215 OFFICE O/P EST HI 40 MIN: CPT

## 2023-07-24 RX ORDER — AMLODIPINE BESYLATE 5 MG/1
5 TABLET ORAL DAILY
Refills: 0 | Status: ACTIVE | COMMUNITY

## 2023-07-24 RX ORDER — GLYCERIN, HYPROMELLOSE, POLYETHYLENE GLYCOL .2; .2; 1 G/100ML; G/100ML; G/100ML
0.2-0.2-1 LIQUID OPHTHALMIC
Qty: 15 | Refills: 0 | Status: COMPLETED | COMMUNITY
Start: 2022-04-04 | End: 2023-07-24

## 2023-07-24 RX ORDER — ATORVASTATIN CALCIUM 10 MG/1
10 TABLET, FILM COATED ORAL
Refills: 0 | Status: ACTIVE | COMMUNITY
Start: 2021-11-13

## 2023-07-24 RX ORDER — LIFITEGRAST 50 MG/ML
5 SOLUTION/ DROPS OPHTHALMIC
Qty: 60 | Refills: 0 | Status: COMPLETED | COMMUNITY
Start: 2021-04-20 | End: 2023-07-24

## 2023-07-24 RX ORDER — LOSARTAN POTASSIUM AND HYDROCHLOROTHIAZIDE 12.5; 5 MG/1; MG/1
50-12.5 TABLET ORAL DAILY
Refills: 0 | Status: ACTIVE | COMMUNITY

## 2023-07-24 RX ORDER — CARBOXYMETHYLCELLULOSE SODIUM 5 MG/ML
0.5 SOLUTION/ DROPS OPHTHALMIC
Qty: 15 | Refills: 0 | Status: COMPLETED | COMMUNITY
Start: 2021-04-20 | End: 2023-07-24

## 2023-07-24 NOTE — HISTORY OF PRESENT ILLNESS
[de-identified] : DEO FRIAS is a 62 year man with PMH of HTN, HLD, gastric cancer (s/p partial gastrectomy in 2017), and nephrolithiasis, who presents for Hematology evaluation of abnormal post-op bleeding.\par \par He initially presented to [facility] on 5/15/23 for a left percutaneous stone removal, which was complicated by post-op hematuria and right groin access pseudoaneurysm. Per Urology, he had more post-op bleeding than expected. PT, PTT, and INR normal. Hematology was consulted for recurrent bleeding in setting of normal coagulation studies. CBC showed mild anemia with normal platelet count. Fibrinogen and vWD panel elevated. Factor XIII borderline low (50%), but hemostasis is usually achieved with levels > 5%. He was recommended to receive a trial of  mg TID x 3 days, with plan for cryoprecipitate (which contains factor XIII) if he continued to have excessive bleeding. He also underwent a left renal angiogram and embolization on 5/26/23. His CBC on discharge (5/27/23) showed mild anemia (Hgb 12.1) and thrombocytosis (plt 481), which was likely reactive.\par \par He previously underwent a partial gastrectomy in 2017 and an appendectomy in his 20s, both without bleeding complications. He had hematuria for a few days post-discharge but currently denies epistaxis, gingival bleeding, petechiae, hematuria, hematochezia, hematemesis, melena, or easy bruising. \par \par Family History:\par - Mother: CAD, DM\par - Father: HTN, CVA\par - No history of bleeding disorders or cancer\par \par Social History:\par - Lives with his wife and son\par - Works part-time as a sculptor\par - Remote tobacco use - 30 years ago\par - Remote heavy alcohol use - 10 years ago\par \par Review of Systems:\par Constitutional: Denies fever/chills, night sweats, unintentional weight loss, lymphadenopathy, fatigue\par HEENT: Denies vision or hearing changes\par Cardiovascular: Denies chest pain and palpitations\par Respiratory: Denies shortness of breath, cough, dyspnea on exertion\par GI: Denies nausea, vomiting, diarrhea, constipation, or abdominal pain\par : Denies urinary frequency or dysuria\par Hematologic: Denies easy bruising or bleeding, epistaxis, gingival bleeding, hematemesis, hematochezia, melena, or hematuria\par Musculoskeletal: Denies muscle/joint pain or weakness\par Neurologic: Denies headaches, weakness, numbness\par Skin: Denies rash and pruritis\par Psych: Denies recent changes in mood

## 2023-07-24 NOTE — ASSESSMENT
[FreeTextEntry1] : DEO FRIAS is a 62 year man with PMH of HTN, HLD, gastric cancer (s/p partial gastrectomy in 2017), and nephrolithiasis, who presents for Hematology evaluation of abnormal post-op bleeding.\par \par # Abnormal post-op bleeding: He underwent a left percutaneous stone removal on 5/15/23, which was complicated by post-op hematuria and right groin access pseudoaneurysm. Per Urology, he had more post-op bleeding than expected. PT, PTT, and INR normal. Factor XIII borderline low (50%), but hemostasis is usually achieved with levels > 5%. He was treated with  mg TID x 3 days and underwent a left renal angiogram and embolization on 5/26/23. He is now not having any bleeding issues. No prior bleeding history, even with surgery. Low suspicion for an underlying bleeding disorder but will plan to recheck coagulation studies and factor XIII.\par - CBC and CMP\par - PT, INR, and PTT\par - Factor XIII\par - Pending lab results, will arrange follow up as clinically indicated

## 2023-07-24 NOTE — REASON FOR VISIT
[Initial Consultation] : an initial consultation for [Family Member] : family member [FreeTextEntry2] : excessive bleeding

## 2023-07-28 ENCOUNTER — NON-APPOINTMENT (OUTPATIENT)
Age: 63
End: 2023-07-28

## 2023-07-28 LAB — FACT XIIIA PPP-ACNC: 101 %

## 2023-08-03 ENCOUNTER — APPOINTMENT (OUTPATIENT)
Dept: UROLOGY | Facility: CLINIC | Age: 63
End: 2023-08-03

## 2023-08-04 ENCOUNTER — APPOINTMENT (OUTPATIENT)
Dept: ULTRASOUND IMAGING | Facility: CLINIC | Age: 63
End: 2023-08-04
Payer: MEDICAID

## 2023-08-04 ENCOUNTER — OUTPATIENT (OUTPATIENT)
Dept: OUTPATIENT SERVICES | Facility: HOSPITAL | Age: 63
LOS: 1 days | End: 2023-08-04
Payer: MEDICAID

## 2023-08-04 DIAGNOSIS — Z90.49 ACQUIRED ABSENCE OF OTHER SPECIFIED PARTS OF DIGESTIVE TRACT: Chronic | ICD-10-CM

## 2023-08-04 DIAGNOSIS — Z98.890 OTHER SPECIFIED POSTPROCEDURAL STATES: Chronic | ICD-10-CM

## 2023-08-04 DIAGNOSIS — Z90.3 ACQUIRED ABSENCE OF STOMACH [PART OF]: Chronic | ICD-10-CM

## 2023-08-04 DIAGNOSIS — Z87.442 PERSONAL HISTORY OF URINARY CALCULI: ICD-10-CM

## 2023-08-04 PROCEDURE — 76775 US EXAM ABDO BACK WALL LIM: CPT

## 2023-08-04 PROCEDURE — 76775 US EXAM ABDO BACK WALL LIM: CPT | Mod: 26

## 2023-08-15 ENCOUNTER — APPOINTMENT (OUTPATIENT)
Dept: UROLOGY | Facility: CLINIC | Age: 63
End: 2023-08-15
Payer: MEDICAID

## 2023-08-15 DIAGNOSIS — E83.50 UNSPECIFIED DISORDER OF CALCIUM METABOLISM: ICD-10-CM

## 2023-08-15 DIAGNOSIS — Z87.442 PERSONAL HISTORY OF URINARY CALCULI: ICD-10-CM

## 2023-08-15 PROCEDURE — 99442: CPT

## 2023-08-15 NOTE — ASSESSMENT
[FreeTextEntry1] : This includes increasing fluid intake to produce 2 to 2.5 liters of urine per day (approx 3 liters intake), and should be primarily water.    Calcium intake should be approximately 1000 mg per day.    Oxalate intake should be reduced- most common sources in diet which have very high levels include peanuts/nuts, tea, coffee, chocolate, spinach, beets, rhubarb, swiss chard.  I've also given/directed to a list with other high oxalate.    Animal flesh protein should be controlled- approx 4 to 6 ounces per day, with some vegetarian days included in the week.  Studies have shown that vegetarians have half the risk of stones of people who eat only 4 ounces per day of meat or fish of any kind (beef, chicken, fish, shellfish, pork, etc), indicating that a vegetarian lifestyle can decrease future stone risks.  Finally, salt intake should be reduced as high levels in the diet will increase urinary calcium.  <2400 mg per day on a low sodium diet is strongly recommended.  Citrate is a benefit; carlos and limes with most citrate and least sugar- recommend 'a lemon or lime a day'; easiest with concentrate, mixed into water or other beverages.  Lifestyle changes: regular exercise and weight loss both are independent risk-reducers for stones.  In addition, for further details online, go to www.[employer].com <http://www.[employer].com> ---> in the lower left column there is a link for "diet resources", and review or download.

## 2023-08-15 NOTE — HISTORY OF PRESENT ILLNESS
[Other Location: e.g. School (Enter Location, City,State)___] : at [unfilled], at the time of the visit. [Other Location: e.g. Home (Enter Location, City,State)___] : at [unfilled] [Family Member] : family member [FreeTextEntry3] : son [FreeTextEntry1] : The patient-doctor relationship has been established in audio HIPAA compliant communication. The patient's identity has been confirmed. The patient was previously emailed a copy of the telemedicine consent. They have had a chance to review and has now given verbal consent and has requested care to be assessed and treated via telemedicine. They understand there may be limitations in this process, and that they may need further followup care in the office and/or hospital settings.  Verbal consent given on Aug 15 2023  9:20AM  DEO FRIAS is a 63 year M who presents for f/u stone metabolic w/u.  hyperuricosuria- 952 (with high animal protein markers) citrate 348 calcium 198 wnl, but NA is elev 248  Renal US reviewed: no stone, no hydro- excellent.  08/15/2023   The patient denies fevers, chills, nausea and/or vomiting, and no unexplained weight loss.  All pertinent parts of the patient PFSH (past medical, family, and social histories), laboratory, radiological studies and available physician notes were reviewed prior to starting the face-to-face portion of the telemedicine visit. Questionnaire results, where appropriate, were discussed with the patient.

## 2023-12-08 NOTE — DISCHARGE NOTE ADULT - CARE PROVIDER_API CALL
Piotr Tinoco (MD), Surgery  [address] [address] [place] [address] [place] [address]  Phone: [phone]  Fax: [phone]
Temples.../Clavicles.../Shoulders.../Dorsal hand...

## 2024-02-17 ENCOUNTER — OUTPATIENT (OUTPATIENT)
Dept: OUTPATIENT SERVICES | Facility: HOSPITAL | Age: 64
LOS: 1 days | End: 2024-02-17
Payer: MEDICAID

## 2024-02-17 ENCOUNTER — APPOINTMENT (OUTPATIENT)
Dept: ULTRASOUND IMAGING | Facility: CLINIC | Age: 64
End: 2024-02-17
Payer: MEDICAID

## 2024-02-17 DIAGNOSIS — Z90.3 ACQUIRED ABSENCE OF STOMACH [PART OF]: Chronic | ICD-10-CM

## 2024-02-17 DIAGNOSIS — Z98.890 OTHER SPECIFIED POSTPROCEDURAL STATES: Chronic | ICD-10-CM

## 2024-02-17 DIAGNOSIS — Z87.442 PERSONAL HISTORY OF URINARY CALCULI: ICD-10-CM

## 2024-02-17 DIAGNOSIS — Z00.8 ENCOUNTER FOR OTHER GENERAL EXAMINATION: ICD-10-CM

## 2024-02-17 DIAGNOSIS — Z90.49 ACQUIRED ABSENCE OF OTHER SPECIFIED PARTS OF DIGESTIVE TRACT: Chronic | ICD-10-CM

## 2024-02-17 PROCEDURE — 76775 US EXAM ABDO BACK WALL LIM: CPT | Mod: 26

## 2024-02-17 PROCEDURE — 76775 US EXAM ABDO BACK WALL LIM: CPT

## 2024-12-25 PROBLEM — F10.90 ALCOHOL USE: Status: ACTIVE | Noted: 2017-04-25

## 2025-06-11 ENCOUNTER — APPOINTMENT (OUTPATIENT)
Dept: VASCULAR SURGERY | Facility: CLINIC | Age: 65
End: 2025-06-11

## 2025-06-25 ENCOUNTER — APPOINTMENT (OUTPATIENT)
Dept: VASCULAR SURGERY | Facility: CLINIC | Age: 65
End: 2025-06-25

## (undated) DEVICE — DRAPE NEPHROSCOPY 72X118"

## (undated) DEVICE — WARMING BLANKET UPPER ADULT

## (undated) DEVICE — GLV 8 PROTEXIS (WHITE)

## (undated) DEVICE — SOL IRR BAG NS 0.9% 3000ML

## (undated) DEVICE — NDL BIOPSY TROCAR TWO-PART 18G X 20CM

## (undated) DEVICE — SOL IRR POUR NS 0.9% 500ML

## (undated) DEVICE — IRR BULB PATHFINDER + 10"

## (undated) DEVICE — PREP BETADINE SPONGE STICKS

## (undated) DEVICE — GOWN TRIMAX LG

## (undated) DEVICE — FOLEY HOLDER STATLOCK 2 WAY ADULT

## (undated) DEVICE — GLV 7 PROTEXIS (WHITE)

## (undated) DEVICE — GLV 6.5 PROTEXIS (WHITE)

## (undated) DEVICE — POSITIONER CUSHION INSERT PRONE VIEW LG

## (undated) DEVICE — TUBING RANGER FLUID IRRIGATION SET DISP

## (undated) DEVICE — POSITIONER FOAM EGG CRATE ULNAR 2PCS (PINK)

## (undated) DEVICE — DRAPE TOWEL BLUE 17" X 24"

## (undated) DEVICE — GLV 7.5 PROTEXIS (WHITE)

## (undated) DEVICE — FOLEY CATH 2-WAY 22FR 5CC LATEX COUNCIL RED

## (undated) DEVICE — DRSG TEGADERM 6"X8"

## (undated) DEVICE — VENODYNE/SCD SLEEVE CALF LARGE

## (undated) DEVICE — NDL 20CM TROCAR

## (undated) DEVICE — DRAPE U 47X51" NON STERILE

## (undated) DEVICE — Device

## (undated) DEVICE — ACMI SELF-SEALING SEAL UP TO 7FR

## (undated) DEVICE — SPECIMEN CONTAINER 100ML

## (undated) DEVICE — ADAPTER CHECK FLO 9FR STERILE

## (undated) DEVICE — TUBING SUCTION 20FT